# Patient Record
Sex: MALE | Race: WHITE | Employment: OTHER | URBAN - METROPOLITAN AREA
[De-identification: names, ages, dates, MRNs, and addresses within clinical notes are randomized per-mention and may not be internally consistent; named-entity substitution may affect disease eponyms.]

---

## 2017-04-15 ENCOUNTER — HOSPITAL ENCOUNTER (EMERGENCY)
Facility: HOSPITAL | Age: 67
Discharge: HOME | End: 2017-04-15
Attending: EMERGENCY MEDICINE
Payer: MEDICARE

## 2017-04-15 VITALS
SYSTOLIC BLOOD PRESSURE: 141 MMHG | RESPIRATION RATE: 20 BRPM | DIASTOLIC BLOOD PRESSURE: 92 MMHG | WEIGHT: 185 LBS | OXYGEN SATURATION: 97 % | TEMPERATURE: 98 F | HEART RATE: 100 BPM

## 2017-04-15 DIAGNOSIS — T14.8XXA BITE BY ANIMAL: Primary | ICD-10-CM

## 2017-04-15 PROCEDURE — 90472 IMMUNIZATION ADMIN EACH ADD: CPT

## 2017-04-15 PROCEDURE — 90471 IMMUNIZATION ADMIN: CPT

## 2017-04-15 PROCEDURE — 96372 THER/PROPH/DIAG INJ SC/IM: CPT

## 2017-04-15 PROCEDURE — 99284 EMERGENCY DEPT VISIT MOD MDM: CPT

## 2017-04-15 NOTE — ED PROVIDER NOTES
Patient Seen in: [facility] AND [facility] Emergency Department    History   Patient presents with:  Bite (integumentary)    Stated Complaint: racoon bite r leg    HPI    75 yo male with a racoon that is living in his garage.  Today he was preparing to put wood p diagnosis)    Disposition:  There is no disposition on file for this visit.     Follow-up:  [facility] [address]  In 3 day(s)  rabies vaccine series      Medications Prescribed:  T

## 2017-04-17 ENCOUNTER — OFFICE VISIT (OUTPATIENT)
Dept: FAMILY MEDICINE CLINIC | Facility: CLINIC | Age: 67
End: 2017-04-17

## 2017-04-17 VITALS
BODY MASS INDEX: 27.02 KG/M2 | HEART RATE: 106 BPM | SYSTOLIC BLOOD PRESSURE: 157 MMHG | WEIGHT: 193 LBS | TEMPERATURE: 98 F | HEIGHT: 71 IN | RESPIRATION RATE: 20 BRPM | DIASTOLIC BLOOD PRESSURE: 87 MMHG

## 2017-04-17 DIAGNOSIS — L98.9 NON-HEALING SKIN LESION OF NOSE: ICD-10-CM

## 2017-04-17 DIAGNOSIS — R10.32 LEFT GROIN PAIN: Primary | ICD-10-CM
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
## 2017-04-17 DIAGNOSIS — Z23 NEED FOR IMMUNIZATION AGAINST RABIES: ICD-10-CM

## 2017-04-17 PROCEDURE — 99213 OFFICE O/P EST LOW 20 MIN: CPT | Performed by: FAMILY MEDICINE

## 2017-04-17 PROCEDURE — G0463 HOSPITAL OUTPT CLINIC VISIT: HCPCS | Performed by: FAMILY MEDICINE

## 2017-04-17 NOTE — PROGRESS NOTES
HPI:    Patient ID: Kaylan King is a 74year old male. HPI Comments: Pt presents for follow up from the ER for a racoon bite on the back of the right leg. Was given rabies immune globulin and started on rabies vaccine.  Patient had wound cleaned at ER and no hernia noted. - To monitor symptoms and call if worse or not better; Follow up as needed. Consider follow up with surgery if sig symptoms/ pain. No orders of the defined types were placed in this encounter.        Meds This Visit:  No prescripti

## 2017-04-18 ENCOUNTER — HOSPITAL ENCOUNTER (OUTPATIENT)
Age: 67
Discharge: HOME | End: 2017-04-18
Payer: MEDICARE

## 2017-04-18 VITALS
HEART RATE: 93 BPM | SYSTOLIC BLOOD PRESSURE: 151 MMHG | RESPIRATION RATE: 22 BRPM | TEMPERATURE: 98 F | OXYGEN SATURATION: 97 % | DIASTOLIC BLOOD PRESSURE: 88 MMHG

## 2017-04-18 PROCEDURE — 99211 OFF/OP EST MAY X REQ PHY/QHP: CPT

## 2017-04-18 PROCEDURE — 90471 IMMUNIZATION ADMIN: CPT

## 2017-04-18 NOTE — ED INITIAL ASSESSMENT (HPI)
Patient arrived for second scheduled dose of Rabies vaccine. Patient received Imovax 1mL IM injection in left deltoid. Powder Lot #: [id number], Exp: 31 Jul 18. Sterile water Lot #: [id number], Exp: 31 Jul 18.

## 2017-04-18 NOTE — ED NOTES
Patient informed to return on 4/22/17 to [address] [place] [address] for next schedule Rabbies vaccination dose. Patient verbalized and demonstrated understanding. Patient in stable condition at time of discharge.

## 2017-04-22 ENCOUNTER — HOSPITAL ENCOUNTER (OUTPATIENT)
Age: 67
Discharge: HOME | End: 2017-04-22
Payer: MEDICARE

## 2017-04-22 VITALS
DIASTOLIC BLOOD PRESSURE: 79 MMHG | HEIGHT: 71 IN | OXYGEN SATURATION: 97 % | WEIGHT: 192 LBS | BODY MASS INDEX: 26.88 KG/M2 | RESPIRATION RATE: 20 BRPM | HEART RATE: 128 BPM | TEMPERATURE: 98 F | SYSTOLIC BLOOD PRESSURE: 152 MMHG

## 2017-04-22 PROCEDURE — 99211 OFF/OP EST MAY X REQ PHY/QHP: CPT

## 2017-04-22 PROCEDURE — 96372 THER/PROPH/DIAG INJ SC/IM: CPT

## 2017-04-22 NOTE — ED NOTES
Patient arrived for third scheduled dose of Rabies vaccine. Patient receive Imovax 1mL IM injection in the right deltoid. Powder Lot#: [id number], Exp: 31 Jul 18. Sterile water Lot#: [id number], Exp: 31 Jul 18. Patient tolerated well.

## 2017-04-29 ENCOUNTER — HOSPITAL ENCOUNTER (OUTPATIENT)
Age: 67
Discharge: HOME | End: 2017-04-29
Payer: MEDICARE

## 2017-04-29 VITALS
SYSTOLIC BLOOD PRESSURE: 161 MMHG | RESPIRATION RATE: 16 BRPM | HEIGHT: 71 IN | HEART RATE: 88 BPM | BODY MASS INDEX: 26.6 KG/M2 | DIASTOLIC BLOOD PRESSURE: 88 MMHG | OXYGEN SATURATION: 97 % | WEIGHT: 190 LBS | TEMPERATURE: 98 F

## 2017-04-29 PROCEDURE — 99211 OFF/OP EST MAY X REQ PHY/QHP: CPT

## 2017-04-29 PROCEDURE — 90471 IMMUNIZATION ADMIN: CPT

## 2017-04-29 NOTE — ED NOTES
Patient came for his last rabies injection. Patient signed consent. Injection given in his right upper arm. He sat for about 10 minutes after the injection to monitor for side effects. Patient denied any distress. Patient was discharged home.

## 2017-09-01 ENCOUNTER — TELEPHONE (OUTPATIENT)
Dept: FAMILY MEDICINE CLINIC | Facility: CLINIC | Age: 67
End: 2017-09-01

## 2017-09-01 DIAGNOSIS — H53.9 VISUAL DISTURBANCE: Primary | ICD-10-CM

## 2017-09-01 NOTE — TELEPHONE ENCOUNTER
Patient called and stated need a new referral request to see [facility]-     Requesting a call back once in place-

## 2017-09-05 NOTE — TELEPHONE ENCOUNTER
Referral request noted. Referral approved, signed and sent to [facility].

## 2017-09-14 ENCOUNTER — OFFICE VISIT (OUTPATIENT)
Dept: FAMILY MEDICINE CLINIC | Facility: CLINIC | Age: 67
End: 2017-09-14

## 2017-09-14 VITALS
HEART RATE: 76 BPM | SYSTOLIC BLOOD PRESSURE: 139 MMHG | RESPIRATION RATE: 18 BRPM | BODY MASS INDEX: 25.76 KG/M2 | HEIGHT: 71 IN | TEMPERATURE: 98 F | DIASTOLIC BLOOD PRESSURE: 84 MMHG | WEIGHT: 184 LBS

## 2017-09-14 DIAGNOSIS — Z00.00 MEDICARE ANNUAL WELLNESS VISIT, SUBSEQUENT: ICD-10-CM

## 2017-09-14 DIAGNOSIS — Z00.00 ENCOUNTER FOR ANNUAL HEALTH EXAMINATION: ICD-10-CM

## 2017-09-14 DIAGNOSIS — H34.8192 RETINAL VEIN OCCLUSION: Primary | ICD-10-CM

## 2017-09-14 DIAGNOSIS — L98.9 SKIN LESION: ICD-10-CM

## 2017-09-14 PROCEDURE — 96160 PT-FOCUSED HLTH RISK ASSMT: CPT | Performed by: FAMILY MEDICINE

## 2017-09-14 NOTE — PROGRESS NOTES
HPI:   Remington Cunha is a 74year old male who presents for a Medicare Initial Annual Wellness visit (Once after 12 month Medicare anniversary) . Patient is here for routine physical exam and medicare annual check up. Chronic active problems as below.  Amanda exertion  CARDIOVASCULAR: denies chest pain on exertion  GI: denies abdominal pain, denies heartburn  : 0 per night nocturia, no complaint of urinary incontinence  MUSCULOSKELETAL: denies back pain  NEURO: denies headaches  PSYCHE: denies depression or a Visual Acuity  Right Eye Visual Acuity: Uncorrected Right Eye Chart Acuity: 20/40   Left Eye Visual Acuity: Uncorrected Left Eye Chart Acuity: 20/200   Both Eyes Visual Acuity: Uncorrected Both Eyes Chart Acuity: 20/40   Able To Tolerate Visual Acuity: No this visit:    Retinal vein occlusion:   - After discussion, To CPM with Dr Disha Valdez- retina specialist for further treatments; To call if any significant symptoms.      Medicare annual wellness visit, subsequent: has had hx of some elevation of blood pres Bathing or Showering: Able without help    Toileting: Able without help    Dressing: Able without help    Eating: Able without help    Driving: Able without help    Preparing your meals: Able without help    Managing money/bills: Able without help    [facility] External Lab or Procedure   Diabetes Screening      HbgA1C   Annually No results found for: A1C    No flowsheet data found.     Fasting Blood Sugar (FSB)Annually No results found for: GLUCOSE, GLU       Cardiovascular Disease Screening     LDL Annually No r No results found for: LDL, LDLC No flowsheet data found. Dilated Eye exam  Annually No flowsheet data found. No flowsheet data found. COPD      Spirometry Testing Annually Spirometry date:  No flowsheet data found.

## 2017-09-14 NOTE — PATIENT INSTRUCTIONS
Chris Parra's SCREENING SCHEDULE   Tests on this list are recommended by your physician but may not be covered, or covered at this frequency, by your insurer. Please check with your insurance carrier before scheduling to verify coverage.     PREVENTATIVE S for this or any previous visit. No flowsheet data found. Fecal Occult Blood   Covered Annually No results found for: FOB, OCCULTSTOOL No flowsheet data found.      Barium Enema-   uncomfortable but covered  Covered but uncomfortable   Glaucoma Screening benefits     Recommended Websites for Advanced Directives    [employer]ek[employer]no. org/publications/Documents/personal_dec. pdf  An information packet, including necessary form from the [employer] website. http://www. [place]state. [place]/publi

## 2017-09-15 LAB
ABSOLUTE BASOPHILS: 43 CELLS/UL (ref 0–200)
ABSOLUTE EOSINOPHILS: 43 CELLS/UL (ref 15–500)
ABSOLUTE LYMPHOCYTES: 1283 CELLS/UL (ref 850–3900)
ABSOLUTE MONOCYTES: 465 CELLS/UL (ref 200–950)
ABSOLUTE NEUTROPHILS: 4365 CELLS/UL (ref 1500–7800)
ALBUMIN/GLOBULIN RATIO: 1.6 (CALC) (ref 1–2.5)
ALBUMIN: 4.3 G/DL (ref 3.6–5.1)
ALKALINE PHOSPHATASE: 60 U/L (ref 40–115)
ALT: 19 U/L (ref 9–46)
AST: 20 U/L (ref 10–35)
BASOPHILS: 0.7 %
BILIRUBIN, TOTAL: 0.6 MG/DL (ref 0.2–1.2)
BUN: 19 MG/DL (ref 7–25)
CALCIUM: 9.2 MG/DL (ref 8.6–10.3)
CARBON DIOXIDE: 25 MMOL/L (ref 20–31)
CHLORIDE: 104 MMOL/L (ref 98–110)
CHOL/HDLC RATIO: 2.7 (CALC)
CHOLESTEROL, TOTAL: 235 MG/DL
CREATININE: 1 MG/DL (ref 0.7–1.25)
EGFR IF AFRICN AM: 91 ML/MIN/1.73M2
EGFR IF NONAFRICN AM: 79 ML/MIN/1.73M2
EOSINOPHILS: 0.7 %
GLOBULIN: 2.7 G/DL (CALC) (ref 1.9–3.7)
GLUCOSE: 102 MG/DL (ref 65–99)
HDL CHOLESTEROL: 86 MG/DL
HEMATOCRIT: 46.3 % (ref 38.5–50)
HEMOGLOBIN: 16.3 G/DL (ref 13.2–17.1)
LDL-CHOLESTEROL: 122 MG/DL (CALC)
LYMPHOCYTES: 20.7 %
MCH: 31 PG (ref 27–33)
MCHC: 35.2 G/DL (ref 32–36)
MCV: 88.2 FL (ref 80–100)
MONOCYTES: 7.5 %
MPV: 10 FL (ref 7.5–12.5)
NEUTROPHILS: 70.4 %
NON-HDL CHOLESTEROL: 149 MG/DL (CALC)
PLATELET COUNT: 254 THOUSAND/UL (ref 140–400)
POTASSIUM: 4.5 MMOL/L (ref 3.5–5.3)
PROTEIN, TOTAL: 7 G/DL (ref 6.1–8.1)
PSA, TOTAL: 0.9 NG/ML
RDW: 11.9 % (ref 11–15)
RED BLOOD CELL COUNT: 5.25 MILLION/UL (ref 4.2–5.8)
SODIUM: 140 MMOL/L (ref 135–146)
TRIGLYCERIDES: 152 MG/DL
WHITE BLOOD CELL COUNT: 6.2 THOUSAND/UL (ref 3.8–10.8)

## 2017-09-21 ENCOUNTER — OFFICE VISIT (OUTPATIENT)
Dept: DERMATOLOGY CLINIC | Facility: CLINIC | Age: 67
End: 2017-09-21

## 2017-09-21 ENCOUNTER — TELEPHONE (OUTPATIENT)
Dept: FAMILY MEDICINE CLINIC | Facility: CLINIC | Age: 67
End: 2017-09-21

## 2017-09-21 DIAGNOSIS — D23.70 BENIGN NEOPLASM OF SKIN OF LOWER LIMB, INCLUDING HIP, UNSPECIFIED LATERALITY: ICD-10-CM

## 2017-09-21 DIAGNOSIS — D48.5 NEOPLASM OF UNCERTAIN BEHAVIOR OF SKIN: Primary | ICD-10-CM

## 2017-09-21 DIAGNOSIS — D23.5 BENIGN NEOPLASM OF SKIN OF TRUNK, EXCEPT SCROTUM: ICD-10-CM

## 2017-09-21 DIAGNOSIS — D23.30 BENIGN NEOPLASM OF SKIN OF FACE: ICD-10-CM

## 2017-09-21 DIAGNOSIS — C44.310 BCC (BASAL CELL CARCINOMA), FACE: Primary | ICD-10-CM

## 2017-09-21 DIAGNOSIS — D23.4 BENIGN NEOPLASM OF SCALP AND SKIN OF NECK: ICD-10-CM

## 2017-09-21 DIAGNOSIS — C44.212: Primary | ICD-10-CM

## 2017-09-21 DIAGNOSIS — D23.60 BENIGN NEOPLASM OF SKIN OF UPPER LIMB, INCLUDING SHOULDER, UNSPECIFIED LATERALITY: ICD-10-CM

## 2017-09-21 PROCEDURE — 88305 TISSUE EXAM BY PATHOLOGIST: CPT | Performed by: DERMATOLOGY

## 2017-09-21 PROCEDURE — 11100 BIOPSY OF SKIN LESION: CPT | Performed by: DERMATOLOGY

## 2017-09-21 PROCEDURE — 99203 OFFICE O/P NEW LOW 30 MIN: CPT | Performed by: DERMATOLOGY

## 2017-09-21 NOTE — TELEPHONE ENCOUNTER
Referral request noted. Referral approved, generated and sent to [facility].

## 2017-09-23 NOTE — PROGRESS NOTES
Derm Nurses: The pathology report from last visit showed  [facility] from right nasal sidewall. Pt aware of likely diagnosis. Plan is excision with Dr. Aguilar Enhaut. Will need referral from Dr. Tony--pt aware . Please log in test results.   Please call patient and i

## 2017-09-25 NOTE — PROGRESS NOTES
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 Informed that Dr. Flxe Saenz recommends further excision by ears/nose/throat plastic surgeon Dr. Charlie Islas.  Pt informed that he will need a referral fo

## 2017-09-25 NOTE — PROGRESS NOTES
9/21/2017 derm path report result logged in log book and in [address].   LMTCB on voice mail

## 2017-09-26 ENCOUNTER — OFFICE VISIT (OUTPATIENT)
Dept: OTOLARYNGOLOGY | Facility: CLINIC | Age: 67
End: 2017-09-26

## 2017-09-26 VITALS
SYSTOLIC BLOOD PRESSURE: 120 MMHG | DIASTOLIC BLOOD PRESSURE: 70 MMHG | HEIGHT: 71 IN | BODY MASS INDEX: 25.76 KG/M2 | WEIGHT: 184 LBS | TEMPERATURE: 97 F

## 2017-09-26 DIAGNOSIS — C11.2: Primary | ICD-10-CM

## 2017-09-26 PROCEDURE — 99204 OFFICE O/P NEW MOD 45 MIN: CPT | Performed by: OTOLARYNGOLOGY

## 2017-09-26 PROCEDURE — G0463 HOSPITAL OUTPT CLINIC VISIT: HCPCS | Performed by: OTOLARYNGOLOGY

## 2017-09-26 NOTE — PROGRESS NOTES
Vish Santamaria is a 74year old male.   Patient presents with:  Bump: bump/lesions at his face for 6 years      HISTORY OF PRESENT ILLNESS  He presents with a 6 year history of a small lesion on his nasal sidewall on the right which is increased to its current 25.66 kg/m²        Constitutional Normal Overall appearance - Normal.   Psychiatric Normal Orientation - Oriented to time, place, person & situation. Appropriate mood and affect.    Neck Exam Normal Inspection - Normal. Palpation - Normal. Parotid gland - N

## 2017-09-26 NOTE — TELEPHONE ENCOUNTER
Referral request noted. Referral approved, signed and sent to [facility].

## 2017-10-02 NOTE — PROGRESS NOTES
Irineo Coleman is a 74year old male. HPI:     CC:  Patient presents with:  Lesion: New pt presents with lesion on nose x6 years. Has grown, bleeds at times. No pain. Father has hx of unknown skin cancer.          Allergies:  Review of patient's allergies natalie medications, allergies reviewed as noted. Physical Examination:     Well-developed well-nourished patient alert oriented in no acute distress.   Exam total-body performed, including scalp, head, neck, face,nails, hair, external eyes, including conjunc See additional diagnoses. Pros cons of various therapies, risks benefits discussed. Pathophysiology discussed with patient. Therapeutic options reviewed. See  Medications in grid. Instructions reviewed at length.     Benign nevi, seborrheic  keratoses, c

## 2017-10-11 ENCOUNTER — LAB REQUISITION (OUTPATIENT)
Dept: LAB | Facility: HOSPITAL | Age: 67
End: 2017-10-11
Payer: MEDICARE

## 2017-10-11 DIAGNOSIS — Z01.89 ENCOUNTER FOR OTHER SPECIFIED SPECIAL EXAMINATIONS (CODE): ICD-10-CM

## 2017-10-11 PROCEDURE — 88305 TISSUE EXAM BY PATHOLOGIST: CPT | Performed by: OTOLARYNGOLOGY

## 2017-10-11 PROCEDURE — 88332 PATH CONSLTJ SURG EA ADD BLK: CPT | Performed by: OTOLARYNGOLOGY

## 2017-10-11 PROCEDURE — 88331 PATH CONSLTJ SURG 1 BLK 1SPC: CPT | Performed by: OTOLARYNGOLOGY

## 2017-10-12 ENCOUNTER — TELEPHONE (OUTPATIENT)
Dept: OTOLARYNGOLOGY | Facility: CLINIC | Age: 67
End: 2017-10-12

## 2017-10-12 NOTE — TELEPHONE ENCOUNTER
Pt is post op day 1, excision and reconstruction. Per pt he is doing well, hardly any pain, no drainage.   Advised pt to monitor for temp > 102 deg F, drainage, bleeding, redness and streaking from incision, pain not relieved by pain med, and contact our o

## 2017-10-19 ENCOUNTER — OFFICE VISIT (OUTPATIENT)
Dept: OTOLARYNGOLOGY | Facility: CLINIC | Age: 67
End: 2017-10-19

## 2017-10-19 VITALS
TEMPERATURE: 98 F | WEIGHT: 184 LBS | BODY MASS INDEX: 25.76 KG/M2 | SYSTOLIC BLOOD PRESSURE: 142 MMHG | DIASTOLIC BLOOD PRESSURE: 70 MMHG | HEIGHT: 71 IN

## 2017-10-19 DIAGNOSIS — C11.2: Primary | ICD-10-CM

## 2017-10-19 PROCEDURE — 99024 POSTOP FOLLOW-UP VISIT: CPT | Performed by: OTOLARYNGOLOGY

## 2017-10-19 PROCEDURE — G0463 HOSPITAL OUTPT CLINIC VISIT: HCPCS | Performed by: OTOLARYNGOLOGY

## 2017-10-19 RX ORDER — CEPHALEXIN 500 MG/1
CAPSULE ORAL
COMMUNITY
Start: 2017-10-11 | End: 2018-12-13

## 2017-10-19 NOTE — PROGRESS NOTES
Fuad Salgado is a 74year old male.   Patient presents with:  Procedure Follow Up: patient presents for f/u on excision of nose lesion on 10/11      HISTORY OF PRESENT ILLNESS  He presents with a 6 year history of a small lesion on his nasal sidewall on the r Neuro Negative Tremors. Psych Negative Anxiety and depression. Integumentary Negative Frequent skin infections, pigment change and rash. Hema/Lymph Negative Easy bleeding and easy bruising.            PHYSICAL EXAM    /70 (BP Location: Left ar nasal sidewall. Routine wound care was discussed and understood. Return to see me in 4 months for routine oncologic follow-up. Yousuf Saenz.  Roman Pimentel MD    10/19/2017    1:22 PM

## 2017-11-27 ENCOUNTER — TELEPHONE (OUTPATIENT)
Dept: FAMILY MEDICINE CLINIC | Facility: CLINIC | Age: 67
End: 2017-11-27

## 2017-11-27 DIAGNOSIS — H34.8120 HEMISPHERIC RETINAL VEIN OCCLUSION WITH MACULAR EDEMA OF LEFT EYE: Primary | ICD-10-CM

## 2017-11-27 NOTE — TELEPHONE ENCOUNTER
Referral request noted. Referral approved, signed and sent to [facility].

## 2017-11-27 NOTE — TELEPHONE ENCOUNTER
Patient called requesting a referral to follow up with Dr Mariposa Bangura.      Please sign off on referral request.     Thank you, Musa

## 2018-02-13 ENCOUNTER — TELEPHONE (OUTPATIENT)
Dept: INTERNAL MEDICINE CLINIC | Facility: CLINIC | Age: 68
End: 2018-02-13

## 2018-02-15 ENCOUNTER — OFFICE VISIT (OUTPATIENT)
Dept: OTOLARYNGOLOGY | Facility: CLINIC | Age: 68
End: 2018-02-15

## 2018-02-15 VITALS
TEMPERATURE: 98 F | DIASTOLIC BLOOD PRESSURE: 88 MMHG | BODY MASS INDEX: 25.76 KG/M2 | HEIGHT: 71 IN | SYSTOLIC BLOOD PRESSURE: 147 MMHG | WEIGHT: 184 LBS

## 2018-02-15 DIAGNOSIS — C11.2: Primary | ICD-10-CM

## 2018-02-15 PROCEDURE — G0463 HOSPITAL OUTPT CLINIC VISIT: HCPCS | Performed by: OTOLARYNGOLOGY

## 2018-02-15 PROCEDURE — 99213 OFFICE O/P EST LOW 20 MIN: CPT | Performed by: OTOLARYNGOLOGY

## 2018-02-15 NOTE — PROGRESS NOTES
Tricia Guerrero is a 79year old male. Patient presents with:   Follow - Up: 4 month follow up- cancer of lateral nasal wall-c/o itchy nose      HISTORY OF PRESENT ILLNESS  He presents with a 6 year history of a small lesion on his nasal sidewall on the right w Respiratory Negative Dyspnea and wheezing. Cardio Negative Chest pain, irregular heartbeat/palpitations and syncope. GI Negative Abdominal pain and diarrhea. Endocrine Negative Cold intolerance and heat intolerance. Neuro Negative Tremors.    Psyc nasal wall ([address]  Doing very well. Some itchiness at the site with some scarring noted. I have asked him to start the use of silicone bandages to this area for the next several months and to return to see me on a as needed basis.   I also asked him to fol

## 2018-02-16 ENCOUNTER — TELEPHONE (OUTPATIENT)
Dept: FAMILY MEDICINE CLINIC | Facility: CLINIC | Age: 68
End: 2018-02-16

## 2018-02-16 NOTE — TELEPHONE ENCOUNTER
Per patient had labs done at [facility] on 9/14/2017 patient has Open[employer] and they didn't cover any chargers as they are advising diagnosis code used is incorrect and needs to be change to reflect the wellness visit per [facility] patient physician needs to c

## 2018-02-19 NOTE — TELEPHONE ENCOUNTER
Tamala Kanner,     In order to correct a dx on lab orders, the provider needs to send corrected orders to [address] [place] [address].     Thank you   Lizeth Pedersen

## 2018-02-19 NOTE — TELEPHONE ENCOUNTER
Dr. Natalia Medeiros please review message below. Contacted [employer] and was told DX used on 9/14/2017 was Z00.00 routine px. Per Sal from [employer] dx only covered 1/4 labs ordered. Covered the CMP, and denied the PSA, Lipid Panel, and CBC.  Per Sal there is no info

## 2018-02-19 NOTE — TELEPHONE ENCOUNTER
Dr. Miguel Bang I called patient's insurance to inquire information regarding denial for labs PSA, Lipid Panel, and CBC. Per Insurance those 3 labs failed the medical  Necessity.  Was told we can send an appeal via mail to;  [address] [facility], [address] [place] [address]

## 2018-02-20 NOTE — TELEPHONE ENCOUNTER
Letter mailed to appeal [address],   [address] [facility]  [place], [address] [facility]

## 2018-02-20 NOTE — TELEPHONE ENCOUNTER
Was instructed provider needs to write medical necessity letter to appeal for the labs that were drawn and not covered. And letter will be mailed to address provided by [address] Dr. Mauro Holter please advise.

## 2018-03-06 ENCOUNTER — MED REC SCAN ONLY (OUTPATIENT)
Dept: FAMILY MEDICINE CLINIC | Facility: CLINIC | Age: 68
End: 2018-03-06

## 2018-04-03 ENCOUNTER — TELEPHONE (OUTPATIENT)
Dept: INTERNAL MEDICINE CLINIC | Facility: CLINIC | Age: 68
End: 2018-04-03

## 2018-04-26 ENCOUNTER — TELEPHONE (OUTPATIENT)
Dept: FAMILY MEDICINE CLINIC | Facility: CLINIC | Age: 68
End: 2018-04-26

## 2018-04-26 DIAGNOSIS — H35.9 RETINAL DISEASE: Primary | ICD-10-CM

## 2018-04-26 NOTE — TELEPHONE ENCOUNTER
Referral request noted. Referral approved, signed and sent to [facility].

## 2018-04-26 NOTE — TELEPHONE ENCOUNTER
Patient called requesting a referral to follow-up with Dr Luciana Evangelista.      Please sign off on referral request.     Thank you, Musa

## 2018-05-25 ENCOUNTER — TELEPHONE (OUTPATIENT)
Dept: FAMILY MEDICINE CLINIC | Facility: CLINIC | Age: 68
End: 2018-05-25

## 2018-05-25 DIAGNOSIS — Z12.11 ENCOUNTER FOR SCREENING COLONOSCOPY: Primary | ICD-10-CM

## 2018-05-25 NOTE — TELEPHONE ENCOUNTER
Patient is requesting a referral for a follow up visit with Dr Manoj Lancaster .  It is time for his 5 year recall for his colonscopy

## 2018-05-26 NOTE — TELEPHONE ENCOUNTER
Referral request noted. Referral approved, signed and sent to [facility].

## 2018-06-11 NOTE — H&P
[facility]    PATIENT'S NAME: J CARLOS SANCHEZ   ATTENDING PHYSICIAN: Gwendolyn Hooks MD   PATIENT ACCOUNT#:   [id number]    LOCATION:    MEDICAL RECORD #:   [id number]       YOB: 1950  ADMISSION DATE:       06/13/2018    HISTORY AND P

## 2018-06-13 ENCOUNTER — HOSPITAL ENCOUNTER (OUTPATIENT)
Facility: HOSPITAL | Age: 68
Setting detail: HOSPITAL OUTPATIENT SURGERY
Discharge: HOME | End: 2018-06-13
Attending: SPECIALIST | Admitting: SPECIALIST
Payer: MEDICARE

## 2018-06-13 ENCOUNTER — TELEPHONE (OUTPATIENT)
Dept: FAMILY MEDICINE CLINIC | Facility: CLINIC | Age: 68
End: 2018-06-13

## 2018-06-13 ENCOUNTER — SURGERY (OUTPATIENT)
Age: 68
End: 2018-06-13

## 2018-06-13 VITALS
SYSTOLIC BLOOD PRESSURE: 147 MMHG | OXYGEN SATURATION: 95 % | BODY MASS INDEX: 24.38 KG/M2 | DIASTOLIC BLOOD PRESSURE: 98 MMHG | HEIGHT: 72 IN | WEIGHT: 180 LBS | RESPIRATION RATE: 18 BRPM | HEART RATE: 85 BPM

## 2018-06-13 DIAGNOSIS — Z12.11 COLON CANCER SCREENING: ICD-10-CM

## 2018-06-13 PROCEDURE — 0DBL8ZX EXCISION OF TRANSVERSE COLON, VIA NATURAL OR ARTIFICIAL OPENING ENDOSCOPIC, DIAGNOSTIC: ICD-10-PCS | Performed by: SPECIALIST

## 2018-06-13 PROCEDURE — 88305 TISSUE EXAM BY PATHOLOGIST: CPT | Performed by: SPECIALIST

## 2018-06-13 PROCEDURE — 99152 MOD SED SAME PHYS/QHP 5/>YRS: CPT | Performed by: SPECIALIST

## 2018-06-13 PROCEDURE — 99153 MOD SED SAME PHYS/QHP EA: CPT | Performed by: SPECIALIST

## 2018-06-13 RX ORDER — SODIUM CHLORIDE 0.9 % (FLUSH) 0.9 %
10 SYRINGE (ML) INJECTION AS NEEDED
Status: DISCONTINUED | OUTPATIENT
Start: 2018-06-13 | End: 2018-06-13

## 2018-06-13 RX ORDER — MIDAZOLAM HYDROCHLORIDE 1 MG/ML
INJECTION INTRAMUSCULAR; INTRAVENOUS
Status: DISCONTINUED | OUTPATIENT
Start: 2018-06-13 | End: 2018-06-13

## 2018-06-13 RX ORDER — SODIUM CHLORIDE, SODIUM LACTATE, POTASSIUM CHLORIDE, CALCIUM CHLORIDE 600; 310; 30; 20 MG/100ML; MG/100ML; MG/100ML; MG/100ML
INJECTION, SOLUTION INTRAVENOUS CONTINUOUS
Status: DISCONTINUED | OUTPATIENT
Start: 2018-06-13 | End: 2018-06-13

## 2018-06-13 RX ORDER — MIDAZOLAM HYDROCHLORIDE 1 MG/ML
1 INJECTION INTRAMUSCULAR; INTRAVENOUS EVERY 5 MIN PRN
Status: DISCONTINUED | OUTPATIENT
Start: 2018-06-13 | End: 2018-06-13

## 2018-06-13 NOTE — OPERATIVE REPORT
[facility]    PATIENT'S NAME: J CARLOS SANCHEZ   ATTENDING PHYSICIAN: Gwendolyn Biggs MD   OPERATING PHYSICIAN: Shantell Bauer.  Maksim Biggs MD   PATIENT ACCOUNT#:   [id number]    LOCATION:  [address] ROOM 5 [facility]  MEDICAL RECORD #:   [id number]

## 2018-06-13 NOTE — TELEPHONE ENCOUNTER
Spoke with Dr Maksim Biggs. Pt appears to have inguinal hernia and was advised to see surgery but pt declined.

## 2018-06-27 ENCOUNTER — MED REC SCAN ONLY (OUTPATIENT)
Dept: FAMILY MEDICINE CLINIC | Facility: CLINIC | Age: 68
End: 2018-06-27

## 2018-10-16 ENCOUNTER — PATIENT OUTREACH (OUTPATIENT)
Dept: CASE MANAGEMENT | Age: 68
End: 2018-10-16

## 2018-10-16 NOTE — PROGRESS NOTES
Pt is eligible for 2018 Medicare Annual Health Assessment appointment. Left message to call back [phone].

## 2018-12-07 NOTE — PROGRESS NOTES
Pt returned call and scheduled his Medicare Annual Exam [facility]) for 12/13/2018 with his PCP. Thank you.

## 2018-12-13 ENCOUNTER — OFFICE VISIT (OUTPATIENT)
Dept: FAMILY MEDICINE CLINIC | Facility: CLINIC | Age: 68
End: 2018-12-13

## 2018-12-13 VITALS
HEART RATE: 81 BPM | SYSTOLIC BLOOD PRESSURE: 137 MMHG | TEMPERATURE: 98 F | RESPIRATION RATE: 18 BRPM | HEIGHT: 71 IN | WEIGHT: 192 LBS | DIASTOLIC BLOOD PRESSURE: 83 MMHG | BODY MASS INDEX: 26.88 KG/M2

## 2018-12-13 DIAGNOSIS — K40.90 LEFT INGUINAL HERNIA: ICD-10-CM

## 2018-12-13 DIAGNOSIS — Z00.00 MEDICARE ANNUAL WELLNESS VISIT, SUBSEQUENT: ICD-10-CM

## 2018-12-13 DIAGNOSIS — Z00.00 ENCOUNTER FOR ANNUAL HEALTH EXAMINATION: Primary | ICD-10-CM

## 2018-12-13 PROCEDURE — G0438 PPPS, INITIAL VISIT: HCPCS | Performed by: FAMILY MEDICINE

## 2018-12-13 PROCEDURE — 96160 PT-FOCUSED HLTH RISK ASSMT: CPT | Performed by: FAMILY MEDICINE

## 2018-12-13 NOTE — PROGRESS NOTES
HPI:   Ronda Watters is a 79year old male who presents for a Medicare Subsequent Annual Wellness visit (Pt already had Initial Annual Wellness). Patient is here for routine physical exam and medicare annual check up. No acute issues.   Patient has been d kg)  02/15/18 : 184 lb (83.5 kg)     Last Cholesterol Labs:   Lab Results   Component Value Date    CHOLEST 235 (H) 09/14/2017    HDL 86 09/14/2017     (H) 09/14/2017    TRIG 152 (H) 09/14/2017          Last Chemistry Labs:   Lab Results   Component Weight as of this encounter: 192 lb (87.1 kg).     Medicare Hearing Assessment  (Required for AWV/SWV)    Hearing Screening    Screening Method:  Questionnaire  I have a problem hearing over the telephone:  No I have trouble following the conversations when present. Cardiovascular: Normal rate, regular rhythm and normal heart sounds. Pulmonary/Chest: Effort normal and breath sounds normal.   Abdominal: Soft. He exhibits no distension and no mass. There is no hepatosplenomegaly. There is no tenderness.  A well-being?: Vis[facility] Friends    This section provided for quick review of chart, separate sheet to patient  [address] [place] [address],[address] Internal Lab or Procedure External Lab or Procedure   Diabetes Screening      HbgA1C   Annually No covered with a cut with metal- TD and TDaP Not covered by Medicare Part B) No vaccine history found This may be covered with your prescription benefits, but Medicare does not cover unless Medically needed    Zoster   Not covered by Medicare Part B No vacci

## 2018-12-13 NOTE — PATIENT INSTRUCTIONS
Wash Dallin Parra's SCREENING SCHEDULE   Tests on this list are recommended by your physician but may not be covered, or covered at this frequency, by your insurer. Please check with your insurance carrier before scheduling to verify coverage.     PREVENTATIVE often if abnormal Colonoscopy due on 06/13/2028 Update [facility] if applicable    Flex Sigmoidoscopy Screen  Covered every 5 years No results found for this or any previous visit. No flowsheet data found.      Fecal Occult Blood   Covered Annually needed    Zoster (Not covered by Medicare Part B) No orders found for this or any previous visit. This may be covered with your pharmacy  prescription benefits     Recommended Websites for Advanced Directives    [email or web address].no. org/publications/Documents/p

## 2018-12-18 ENCOUNTER — MA CHART PREP (OUTPATIENT)
Dept: FAMILY MEDICINE CLINIC | Facility: CLINIC | Age: 68
End: 2018-12-18

## 2018-12-18 PROBLEM — Z85.828 HISTORY OF SKIN CANCER: Status: ACTIVE | Noted: 2018-12-18

## 2019-03-18 ENCOUNTER — PATIENT OUTREACH (OUTPATIENT)
Dept: CASE MANAGEMENT | Age: 69
End: 2019-03-18

## 2019-03-18 NOTE — PROGRESS NOTES
Pt is eligible for 2019 Medicare Annual Health Assessment visit. Left message to call back [phone].

## 2019-05-02 ENCOUNTER — PATIENT OUTREACH (OUTPATIENT)
Dept: CASE MANAGEMENT | Age: 69
End: 2019-05-02

## 2019-05-02 NOTE — PROGRESS NOTES
Pt is eligible for 2019 Medicare Annual Health Assessment visit. Left message to call back [phone].

## 2019-05-06 ENCOUNTER — PATIENT OUTREACH (OUTPATIENT)
Dept: CASE MANAGEMENT | Age: 69
End: 2019-05-06

## 2019-05-06 NOTE — PROGRESS NOTES
Pt is eligible for 2019 Medicare Annual Health Assessment. After multiple attempts to reach patient by phone a letter was sent to patient requesting a call back to discuss.

## 2019-08-22 ENCOUNTER — TELEPHONE (OUTPATIENT)
Dept: CASE MANAGEMENT | Age: 69
End: 2019-08-22

## 2019-08-22 NOTE — TELEPHONE ENCOUNTER
Patient is eligible for a 2019 Annual Medicare Health Assessment. Left message to call back [phone]. Letter sent 5/2019.

## 2019-09-01 ENCOUNTER — MA CHART PREP (OUTPATIENT)
Dept: FAMILY MEDICINE CLINIC | Facility: CLINIC | Age: 69
End: 2019-09-01

## 2019-11-18 ENCOUNTER — OFFICE VISIT (OUTPATIENT)
Dept: FAMILY MEDICINE CLINIC | Facility: CLINIC | Age: 69
End: 2019-11-18
Payer: MEDICARE

## 2019-11-18 VITALS
DIASTOLIC BLOOD PRESSURE: 81 MMHG | RESPIRATION RATE: 18 BRPM | HEART RATE: 84 BPM | HEIGHT: 71.5 IN | BODY MASS INDEX: 25.61 KG/M2 | WEIGHT: 187 LBS | TEMPERATURE: 98 F | SYSTOLIC BLOOD PRESSURE: 126 MMHG

## 2019-11-18 DIAGNOSIS — E78.00 ELEVATED CHOLESTEROL: ICD-10-CM

## 2019-11-18 DIAGNOSIS — Z12.5 PROSTATE CANCER SCREENING: ICD-10-CM

## 2019-11-18 DIAGNOSIS — Z00.00 MEDICARE ANNUAL WELLNESS VISIT, SUBSEQUENT: ICD-10-CM

## 2019-11-18 DIAGNOSIS — K40.90 LEFT INGUINAL HERNIA: Primary | ICD-10-CM

## 2019-11-18 DIAGNOSIS — Z00.00 ENCOUNTER FOR ANNUAL HEALTH EXAMINATION: ICD-10-CM

## 2019-11-18 PROCEDURE — 96160 PT-FOCUSED HLTH RISK ASSMT: CPT | Performed by: FAMILY MEDICINE

## 2019-11-18 PROCEDURE — 99397 PER PM REEVAL EST PAT 65+ YR: CPT | Performed by: FAMILY MEDICINE

## 2019-11-18 PROCEDURE — G0439 PPPS, SUBSEQ VISIT: HCPCS | Performed by: FAMILY MEDICINE

## 2019-11-18 NOTE — PATIENT INSTRUCTIONS
Tyler Parra's SCREENING SCHEDULE   Tests on this list are recommended by your physician but may not be covered, or covered at this frequency, by your insurer. Please check with your insurance carrier before scheduling to verify coverage.     PREVENTATIVE often if abnormal Colonoscopy due on 06/13/2028 Update [facility] if applicable    Flex Sigmoidoscopy Screen  Covered every 5 years No results found for this or any previous visit. No flowsheet data found.      Fecal Occult Blood   Covered Annually needed    Zoster (Not covered by Medicare Part B) No orders found for this or any previous visit. This may be covered with your pharmacy  prescription benefits     Recommended Websites for Advanced Directives    [email or web address]no. org/publications/Documents/p

## 2019-11-18 NOTE — PROGRESS NOTES
HPI:   Jill Tadeo is a 81year old male who presents for a Medicare Subsequent Annual Wellness visit (Pt already had Initial Annual Wellness). Patient is here for routine physical exam and medicare annual check up.  Patient is requesting annual testing 180 lb (81.6 kg)     Last Cholesterol Labs:   Lab Results   Component Value Date    CHOLEST 235 (H) 09/14/2017    HDL 86 09/14/2017     (H) 09/14/2017    TRIG 152 (H) 09/14/2017          Last Chemistry Labs:   Lab Results   Component Value Date    A encounter: 5' 11.5\" (1.816 m). Weight as of this encounter: 187 lb (84.8 kg).     Medicare Hearing Assessment  (Required for AWV/SWV)    Hearing Screening    Screening Method:  Questionnaire  I have a problem hearing over the telephone:  No I have troub thyromegaly present. Cardiovascular: Normal rate, regular rhythm, normal heart sounds and intact distal pulses. No murmur heard. Pulmonary/Chest: Effort normal and breath sounds normal. No respiratory distress. He has no wheezes. He has no rales.    A further management after testing. Routine follow up. Diet assessment: good     PLAN:  The patient indicates understanding of these issues and agrees to the plan. Reinforced healthy diet, lifestyle, and exercise. No follow-ups on file. (Prevnar)  Covered Once after 65 No vaccine history found Please get once after your 65th birthday    Pneumococcal 23 (Pneumovax)  Covered Once after 65 No vaccine history found Please get once after your 65th birthday    Hepatitis B for Moderate/High Risk

## 2019-11-20 ENCOUNTER — APPOINTMENT (OUTPATIENT)
Dept: LAB | Age: 69
End: 2019-11-20
Attending: FAMILY MEDICINE
Payer: MEDICARE

## 2019-11-20 DIAGNOSIS — E78.00 ELEVATED CHOLESTEROL: ICD-10-CM

## 2019-11-20 DIAGNOSIS — Z12.5 PROSTATE CANCER SCREENING: ICD-10-CM

## 2019-11-20 PROCEDURE — 80053 COMPREHEN METABOLIC PANEL: CPT

## 2019-11-20 PROCEDURE — 80061 LIPID PANEL: CPT

## 2019-11-20 PROCEDURE — 85027 COMPLETE CBC AUTOMATED: CPT

## 2019-11-20 PROCEDURE — 36415 COLL VENOUS BLD VENIPUNCTURE: CPT

## 2020-07-27 ENCOUNTER — TELEPHONE (OUTPATIENT)
Dept: CASE MANAGEMENT | Age: 70
End: 2020-07-27

## 2020-08-17 ENCOUNTER — TELEPHONE (OUTPATIENT)
Dept: CASE MANAGEMENT | Age: 70
End: 2020-08-17

## 2020-08-17 NOTE — TELEPHONE ENCOUNTER
Patient is eligible for a 2020 Medicare Advantage Supervisit. Left message to call back [phone].

## 2020-09-03 ENCOUNTER — TELEPHONE (OUTPATIENT)
Dept: CASE MANAGEMENT | Age: 70
End: 2020-09-03

## 2020-09-03 NOTE — TELEPHONE ENCOUNTER
Patient is eligible for a 2020 Medicare Advantage Supervisit. Discussed in detail w/patient. Patient declined to schedule at this time.

## 2020-12-03 ENCOUNTER — TELEPHONE (OUTPATIENT)
Dept: FAMILY MEDICINE CLINIC | Facility: CLINIC | Age: 70
End: 2020-12-03

## 2020-12-22 ENCOUNTER — TELEPHONE (OUTPATIENT)
Dept: FAMILY MEDICINE CLINIC | Facility: CLINIC | Age: 70
End: 2020-12-22

## 2021-01-18 ENCOUNTER — MED REC SCAN ONLY (OUTPATIENT)
Dept: FAMILY MEDICINE CLINIC | Facility: CLINIC | Age: 71
End: 2021-01-18

## 2021-01-19 ENCOUNTER — TELEPHONE (OUTPATIENT)
Dept: CASE MANAGEMENT | Age: 71
End: 2021-01-19

## 2021-01-19 DIAGNOSIS — H26.9 CATARACT OF LEFT EYE, UNSPECIFIED CATARACT TYPE: Primary | ICD-10-CM

## 2021-01-19 NOTE — TELEPHONE ENCOUNTER
Pt calling in regards to a referral to see Dr. Aleah Narvaez. Pt advised by his retinal specialist to get this taken care of. Please sign off on referral if you agree with plan of care.

## 2021-01-19 NOTE — TELEPHONE ENCOUNTER
Referral request noted. Referral approved, signed and sent to [facility].   Pt notified through [facility]

## 2021-02-15 ENCOUNTER — TELEPHONE (OUTPATIENT)
Dept: FAMILY MEDICINE CLINIC | Facility: CLINIC | Age: 71
End: 2021-02-15

## 2021-02-15 NOTE — TELEPHONE ENCOUNTER
Patient all message left on pt  Voicemail to call us o schedule pre op visit before his Cataract surgery on 3/9/21. Call Center or Psr please reach out to pt again to schedule pre op appointment.   thanks

## 2021-02-16 ENCOUNTER — OFFICE VISIT (OUTPATIENT)
Dept: FAMILY MEDICINE CLINIC | Facility: CLINIC | Age: 71
End: 2021-02-16
Payer: MEDICARE

## 2021-02-16 VITALS
WEIGHT: 193 LBS | SYSTOLIC BLOOD PRESSURE: 118 MMHG | RESPIRATION RATE: 20 BRPM | HEART RATE: 85 BPM | HEIGHT: 72 IN | BODY MASS INDEX: 26.14 KG/M2 | DIASTOLIC BLOOD PRESSURE: 76 MMHG | TEMPERATURE: 98 F

## 2021-02-16 DIAGNOSIS — Z01.818 PREOPERATIVE GENERAL PHYSICAL EXAMINATION: ICD-10-CM

## 2021-02-16 DIAGNOSIS — H26.9 CATARACT OF LEFT EYE, UNSPECIFIED CATARACT TYPE: ICD-10-CM

## 2021-02-16 PROCEDURE — 99214 OFFICE O/P EST MOD 30 MIN: CPT | Performed by: FAMILY MEDICINE

## 2021-02-16 PROCEDURE — 3008F BODY MASS INDEX DOCD: CPT | Performed by: FAMILY MEDICINE

## 2021-02-16 PROCEDURE — 3074F SYST BP LT 130 MM HG: CPT | Performed by: FAMILY MEDICINE

## 2021-02-16 PROCEDURE — 3078F DIAST BP <80 MM HG: CPT | Performed by: FAMILY MEDICINE

## 2021-02-16 NOTE — PROGRESS NOTES
HPI:    Patient ID: Westley Frank is a 73year old male. Patient is here for for preoperative history and physical. The patient will be having cataract surgery of the left eye with Dr. Kellie Montero on 3/9/2021 at [facility]. No acute issues or problems.  No sig  Psychiatric: He has a normal mood and affect. His behavior is normal.              ASSESSMENT/PLAN:   Preoperative general physical examination/ Cataract of left eye, unspecified cataract type:  - Exam unremarkable:   Will clear patient for surgery with D

## 2021-02-17 ENCOUNTER — TELEPHONE (OUTPATIENT)
Dept: FAMILY MEDICINE CLINIC | Facility: CLINIC | Age: 71
End: 2021-02-17

## 2021-02-17 NOTE — TELEPHONE ENCOUNTER
Patient will call us when he wants to schedule his next medicare wellness exam/Dr. Luis Fernando Chambers

## 2021-03-06 ENCOUNTER — TELEPHONE (OUTPATIENT)
Dept: FAMILY MEDICINE CLINIC | Facility: CLINIC | Age: 71
End: 2021-03-06

## 2021-03-06 ENCOUNTER — LAB REQUISITION (OUTPATIENT)
Dept: LAB | Facility: HOSPITAL | Age: 71
End: 2021-03-06
Payer: MEDICARE

## 2021-03-06 DIAGNOSIS — Z01.818 ENCOUNTER FOR OTHER PREPROCEDURAL EXAMINATION: ICD-10-CM

## 2021-03-06 LAB — SARS-COV-2 RNA RESP QL NAA+PROBE: NOT DETECTED

## 2021-04-19 ENCOUNTER — TELEPHONE (OUTPATIENT)
Dept: CASE MANAGEMENT | Age: 71
End: 2021-04-19

## 2021-04-27 ENCOUNTER — TELEPHONE (OUTPATIENT)
Dept: FAMILY MEDICINE CLINIC | Facility: CLINIC | Age: 71
End: 2021-04-27

## 2021-05-04 ENCOUNTER — MED REC SCAN ONLY (OUTPATIENT)
Dept: FAMILY MEDICINE CLINIC | Facility: CLINIC | Age: 71
End: 2021-05-04

## 2021-05-26 ENCOUNTER — TELEPHONE (OUTPATIENT)
Dept: FAMILY MEDICINE CLINIC | Facility: CLINIC | Age: 71
End: 2021-05-26

## 2021-06-04 ENCOUNTER — TELEPHONE (OUTPATIENT)
Dept: FAMILY MEDICINE CLINIC | Facility: CLINIC | Age: 71
End: 2021-06-04

## 2021-06-04 NOTE — TELEPHONE ENCOUNTER
Anya returned call and the patient was sheduled for pre op visit with Dr. Carlos Jaffe for 6/7 @ 10:20AM.

## 2021-06-04 NOTE — TELEPHONE ENCOUNTER
Per Nanette/arden burris to book the patient on Monday at 10:20 for pre op. Phone Anya at Dr. Wilton Mejia office and was placed on hold. Call was disconnected. When, [facility] phones back please scheduled the appointment.

## 2021-06-04 NOTE — TELEPHONE ENCOUNTER
Jaye Schneider, states that the patient is scheduled for surgery on 6-10-21 for detached retina of the left eye. Jaye Schneider, would like to know if the doctor can see the patient on Monday for pre op clearance. There are no available appointments.

## 2021-06-07 ENCOUNTER — MED REC SCAN ONLY (OUTPATIENT)
Dept: FAMILY MEDICINE CLINIC | Facility: CLINIC | Age: 71
End: 2021-06-07

## 2021-06-07 ENCOUNTER — OFFICE VISIT (OUTPATIENT)
Dept: FAMILY MEDICINE CLINIC | Facility: CLINIC | Age: 71
End: 2021-06-07
Payer: MEDICARE

## 2021-06-07 ENCOUNTER — LAB REQUISITION (OUTPATIENT)
Dept: LAB | Facility: HOSPITAL | Age: 71
End: 2021-06-07
Payer: MEDICARE

## 2021-06-07 ENCOUNTER — LAB ENCOUNTER (OUTPATIENT)
Dept: LAB | Age: 71
End: 2021-06-07
Attending: FAMILY MEDICINE
Payer: MEDICARE

## 2021-06-07 VITALS
HEIGHT: 72 IN | SYSTOLIC BLOOD PRESSURE: 134 MMHG | BODY MASS INDEX: 24.89 KG/M2 | HEART RATE: 93 BPM | WEIGHT: 183.81 LBS | DIASTOLIC BLOOD PRESSURE: 83 MMHG

## 2021-06-07 DIAGNOSIS — H35.9 RETINAL AND VITREOUS DISORDER: ICD-10-CM

## 2021-06-07 DIAGNOSIS — Z01.818 ENCOUNTER FOR OTHER PREPROCEDURAL EXAMINATION: ICD-10-CM

## 2021-06-07 DIAGNOSIS — H43.9 RETINAL AND VITREOUS DISORDER: ICD-10-CM

## 2021-06-07 DIAGNOSIS — Z01.818 PREOPERATIVE GENERAL PHYSICAL EXAMINATION: Primary | ICD-10-CM

## 2021-06-07 DIAGNOSIS — Z01.818 PREOPERATIVE GENERAL PHYSICAL EXAMINATION: ICD-10-CM

## 2021-06-07 PROCEDURE — 3008F BODY MASS INDEX DOCD: CPT | Performed by: FAMILY MEDICINE

## 2021-06-07 PROCEDURE — 99214 OFFICE O/P EST MOD 30 MIN: CPT | Performed by: FAMILY MEDICINE

## 2021-06-07 PROCEDURE — 93010 ELECTROCARDIOGRAM REPORT: CPT | Performed by: FAMILY MEDICINE

## 2021-06-07 PROCEDURE — 85027 COMPLETE CBC AUTOMATED: CPT

## 2021-06-07 PROCEDURE — 36415 COLL VENOUS BLD VENIPUNCTURE: CPT

## 2021-06-07 PROCEDURE — 93005 ELECTROCARDIOGRAM TRACING: CPT

## 2021-06-07 PROCEDURE — 3075F SYST BP GE 130 - 139MM HG: CPT | Performed by: FAMILY MEDICINE

## 2021-06-07 PROCEDURE — 3079F DIAST BP 80-89 MM HG: CPT | Performed by: FAMILY MEDICINE

## 2021-06-07 NOTE — PROGRESS NOTES
HPI/Subjective:   Patient ID: Rishi Sun is a 73year old male. Patient is here for for preoperative history and physical. The patient will be having eye surgery with Dr. Ramírez Pham on 6/10 /21 for his left eye.  No acute issues or problems or any sig ch Palpations: Abdomen is soft. Genitourinary:     Penis: Normal.       Prostate: Normal.      Rectum: Normal.   Musculoskeletal:         General: Normal range of motion. Cervical back: Normal range of motion and neck supple.    Skin:     General: Skin

## 2021-06-08 ENCOUNTER — TELEPHONE (OUTPATIENT)
Dept: FAMILY MEDICINE CLINIC | Facility: CLINIC | Age: 71
End: 2021-06-08

## 2021-06-08 NOTE — TELEPHONE ENCOUNTER
Patient Pre op information fax over to [facility] Attn: Dr. Mimi Silver @ [phone] confirmation recd @ 13:28pm .   Pre op information will be in my yellow if needed until patient surgery date 6/10/21

## 2021-07-21 ENCOUNTER — TELEPHONE (OUTPATIENT)
Dept: FAMILY MEDICINE CLINIC | Facility: CLINIC | Age: 71
End: 2021-07-21

## 2021-07-21 NOTE — TELEPHONE ENCOUNTER
Letter with addendum generated and no need to repeat preop visit and testing and just done last month.  New letter given to staff to fax

## 2021-07-21 NOTE — TELEPHONE ENCOUNTER
Dr. Deon Moeller Please see message below for emergency surgery.   Please addendum pre op from  [facility]

## 2021-07-21 NOTE — TELEPHONE ENCOUNTER
Per April, patient had a pre-op last 06/07/2021 and would like to know if Dr Michael Driver and issue his pre-op so that they can schedule him for his scleral buckle with vitrectomy for tomorrow. Please advise. Please fax the medical clearance @ [phone].

## 2021-07-21 NOTE — TELEPHONE ENCOUNTER
See message below for detail. Addendum letter and pre op information fax Attn Anya @ [facility] . [phone]

## 2021-07-21 NOTE — TELEPHONE ENCOUNTER
Anya/[facility] [phone] states that the patient is having emergency tomorrow. Lindsey Weiss, would like to know if the last pre op clearance is ok for the patient and can it be amended. If not the patient needs to be seen asap.  If going be amended please f

## 2021-07-22 ENCOUNTER — ANESTHESIA EVENT (OUTPATIENT)
Dept: SURGERY | Facility: HOSPITAL | Age: 71
End: 2021-07-22
Payer: MEDICARE

## 2021-07-22 ENCOUNTER — HOSPITAL ENCOUNTER (OUTPATIENT)
Facility: HOSPITAL | Age: 71
Setting detail: HOSPITAL OUTPATIENT SURGERY
Discharge: HOME | End: 2021-07-22
Attending: OPHTHALMOLOGY | Admitting: OPHTHALMOLOGY
Payer: MEDICARE

## 2021-07-22 ENCOUNTER — ANESTHESIA (OUTPATIENT)
Dept: SURGERY | Facility: HOSPITAL | Age: 71
End: 2021-07-22
Payer: MEDICARE

## 2021-07-22 VITALS
DIASTOLIC BLOOD PRESSURE: 86 MMHG | OXYGEN SATURATION: 98 % | RESPIRATION RATE: 18 BRPM | SYSTOLIC BLOOD PRESSURE: 137 MMHG | HEIGHT: 72 IN | TEMPERATURE: 98 F | BODY MASS INDEX: 24.65 KG/M2 | WEIGHT: 182 LBS | HEART RATE: 93 BPM

## 2021-07-22 LAB — SARS-COV-2 RNA RESP QL NAA+PROBE: NOT DETECTED

## 2021-07-22 PROCEDURE — 08953ZZ DRAINAGE OF LEFT VITREOUS, PERCUTANEOUS APPROACH: ICD-10-PCS | Performed by: OPHTHALMOLOGY

## 2021-07-22 PROCEDURE — 3E0C3BZ INTRODUCTION OF ANESTHETIC AGENT INTO EYE, PERCUTANEOUS APPROACH: ICD-10-PCS | Performed by: OPHTHALMOLOGY

## 2021-07-22 DEVICE — SPONGE SCLR 80X5X2.5MM SIL HLF: Type: IMPLANTABLE DEVICE | Status: FUNCTIONAL

## 2021-07-22 RX ORDER — MORPHINE SULFATE 4 MG/ML
2 INJECTION, SOLUTION INTRAMUSCULAR; INTRAVENOUS EVERY 10 MIN PRN
Status: DISCONTINUED | OUTPATIENT
Start: 2021-07-22 | End: 2021-07-22

## 2021-07-22 RX ORDER — ATROPINE SULFATE 10 MG/ML
1 SOLUTION/ DROPS OPHTHALMIC AS NEEDED
Status: DISCONTINUED | OUTPATIENT
Start: 2021-07-22 | End: 2021-07-22 | Stop reason: HOSPADM

## 2021-07-22 RX ORDER — PHENYLEPHRINE HCL 2.5 %
2 DROPS OPHTHALMIC (EYE) AS NEEDED
Status: DISCONTINUED | OUTPATIENT
Start: 2021-07-22 | End: 2021-07-22 | Stop reason: HOSPADM

## 2021-07-22 RX ORDER — HYDROMORPHONE HYDROCHLORIDE 1 MG/ML
0.6 INJECTION, SOLUTION INTRAMUSCULAR; INTRAVENOUS; SUBCUTANEOUS EVERY 5 MIN PRN
Status: DISCONTINUED | OUTPATIENT
Start: 2021-07-22 | End: 2021-07-22

## 2021-07-22 RX ORDER — PROCHLORPERAZINE EDISYLATE 5 MG/ML
5 INJECTION INTRAMUSCULAR; INTRAVENOUS ONCE AS NEEDED
Status: DISCONTINUED | OUTPATIENT
Start: 2021-07-22 | End: 2021-07-22

## 2021-07-22 RX ORDER — LIDOCAINE HYDROCHLORIDE 10 MG/ML
INJECTION, SOLUTION EPIDURAL; INFILTRATION; INTRACAUDAL; PERINEURAL AS NEEDED
Status: DISCONTINUED | OUTPATIENT
Start: 2021-07-22 | End: 2021-07-22 | Stop reason: SURG

## 2021-07-22 RX ORDER — HYDROMORPHONE HYDROCHLORIDE 1 MG/ML
0.4 INJECTION, SOLUTION INTRAMUSCULAR; INTRAVENOUS; SUBCUTANEOUS EVERY 5 MIN PRN
Status: DISCONTINUED | OUTPATIENT
Start: 2021-07-22 | End: 2021-07-22

## 2021-07-22 RX ORDER — SODIUM CHLORIDE, SODIUM LACTATE, POTASSIUM CHLORIDE, CALCIUM CHLORIDE 600; 310; 30; 20 MG/100ML; MG/100ML; MG/100ML; MG/100ML
INJECTION, SOLUTION INTRAVENOUS CONTINUOUS
Status: DISCONTINUED | OUTPATIENT
Start: 2021-07-22 | End: 2021-07-22

## 2021-07-22 RX ORDER — MIDAZOLAM HYDROCHLORIDE 1 MG/ML
INJECTION INTRAMUSCULAR; INTRAVENOUS AS NEEDED
Status: DISCONTINUED | OUTPATIENT
Start: 2021-07-22 | End: 2021-07-22 | Stop reason: SURG

## 2021-07-22 RX ORDER — MORPHINE SULFATE 4 MG/ML
4 INJECTION, SOLUTION INTRAMUSCULAR; INTRAVENOUS EVERY 10 MIN PRN
Status: DISCONTINUED | OUTPATIENT
Start: 2021-07-22 | End: 2021-07-22

## 2021-07-22 RX ORDER — NALOXONE HYDROCHLORIDE 0.4 MG/ML
80 INJECTION, SOLUTION INTRAMUSCULAR; INTRAVENOUS; SUBCUTANEOUS AS NEEDED
Status: DISCONTINUED | OUTPATIENT
Start: 2021-07-22 | End: 2021-07-22

## 2021-07-22 RX ORDER — ONDANSETRON 2 MG/ML
4 INJECTION INTRAMUSCULAR; INTRAVENOUS ONCE AS NEEDED
Status: DISCONTINUED | OUTPATIENT
Start: 2021-07-22 | End: 2021-07-22

## 2021-07-22 RX ORDER — GENTAMICIN SULFATE 3 MG/ML
2 SOLUTION/ DROPS OPHTHALMIC AS NEEDED
Status: DISCONTINUED | OUTPATIENT
Start: 2021-07-22 | End: 2021-07-22 | Stop reason: HOSPADM

## 2021-07-22 RX ORDER — ACETAMINOPHEN 500 MG
1000 TABLET ORAL ONCE
Status: COMPLETED | OUTPATIENT
Start: 2021-07-22 | End: 2021-07-22

## 2021-07-22 RX ORDER — HALOPERIDOL 5 MG/ML
0.25 INJECTION INTRAMUSCULAR ONCE AS NEEDED
Status: DISCONTINUED | OUTPATIENT
Start: 2021-07-22 | End: 2021-07-22

## 2021-07-22 RX ORDER — MORPHINE SULFATE 10 MG/ML
6 INJECTION, SOLUTION INTRAMUSCULAR; INTRAVENOUS EVERY 10 MIN PRN
Status: DISCONTINUED | OUTPATIENT
Start: 2021-07-22 | End: 2021-07-22

## 2021-07-22 RX ORDER — HYDROCODONE BITARTRATE AND ACETAMINOPHEN 5; 325 MG/1; MG/1
2 TABLET ORAL AS NEEDED
Status: DISCONTINUED | OUTPATIENT
Start: 2021-07-22 | End: 2021-07-22

## 2021-07-22 RX ORDER — DEXAMETHASONE SODIUM PHOSPHATE 4 MG/ML
VIAL (ML) INJECTION AS NEEDED
Status: DISCONTINUED | OUTPATIENT
Start: 2021-07-22 | End: 2021-07-22 | Stop reason: SURG

## 2021-07-22 RX ORDER — HYDROMORPHONE HYDROCHLORIDE 1 MG/ML
0.2 INJECTION, SOLUTION INTRAMUSCULAR; INTRAVENOUS; SUBCUTANEOUS EVERY 5 MIN PRN
Status: DISCONTINUED | OUTPATIENT
Start: 2021-07-22 | End: 2021-07-22

## 2021-07-22 RX ORDER — ONDANSETRON 2 MG/ML
INJECTION INTRAMUSCULAR; INTRAVENOUS AS NEEDED
Status: DISCONTINUED | OUTPATIENT
Start: 2021-07-22 | End: 2021-07-22 | Stop reason: SURG

## 2021-07-22 RX ORDER — HYDROCODONE BITARTRATE AND ACETAMINOPHEN 5; 325 MG/1; MG/1
1 TABLET ORAL AS NEEDED
Status: DISCONTINUED | OUTPATIENT
Start: 2021-07-22 | End: 2021-07-22

## 2021-07-22 RX ORDER — ATROPINE SULFATE 10 MG/ML
SOLUTION/ DROPS OPHTHALMIC AS NEEDED
Status: DISCONTINUED | OUTPATIENT
Start: 2021-07-22 | End: 2021-07-22 | Stop reason: HOSPADM

## 2021-07-22 RX ORDER — BALANCED SALT SOLUTION 6.4; .75; .48; .3; 3.9; 1.7 MG/ML; MG/ML; MG/ML; MG/ML; MG/ML; MG/ML
SOLUTION OPHTHALMIC AS NEEDED
Status: DISCONTINUED | OUTPATIENT
Start: 2021-07-22 | End: 2021-07-22 | Stop reason: HOSPADM

## 2021-07-22 RX ADMIN — SODIUM CHLORIDE, SODIUM LACTATE, POTASSIUM CHLORIDE, CALCIUM CHLORIDE: 600; 310; 30; 20 INJECTION, SOLUTION INTRAVENOUS at 16:24:00

## 2021-07-22 RX ADMIN — MIDAZOLAM HYDROCHLORIDE 2 MG: 1 INJECTION INTRAMUSCULAR; INTRAVENOUS at 14:40:00

## 2021-07-22 RX ADMIN — LIDOCAINE HYDROCHLORIDE 50 MG: 10 INJECTION, SOLUTION EPIDURAL; INFILTRATION; INTRACAUDAL; PERINEURAL at 14:42:00

## 2021-07-22 RX ADMIN — DEXAMETHASONE SODIUM PHOSPHATE 8 MG: 4 MG/ML VIAL (ML) INJECTION at 14:50:00

## 2021-07-22 RX ADMIN — ONDANSETRON 4 MG: 2 INJECTION INTRAMUSCULAR; INTRAVENOUS at 16:08:00

## 2021-07-22 RX ADMIN — SODIUM CHLORIDE, SODIUM LACTATE, POTASSIUM CHLORIDE, CALCIUM CHLORIDE: 600; 310; 30; 20 INJECTION, SOLUTION INTRAVENOUS at 14:38:00

## 2021-07-22 NOTE — ANESTHESIA PROCEDURE NOTES
Airway  Date/Time: 7/22/2021 2:44 PM  Urgency: Elective    Airway not difficult    General Information and Staff    Patient location during procedure: OR  Anesthesiologist: Mulugeta Golden MD  Resident/CRNA: Jose Davies CRNA  Performed: CRNA     I

## 2021-07-22 NOTE — ANESTHESIA PREPROCEDURE EVALUATION
Anesthesia PreOp Note    HPI:     Jill Tadeo is a 73year old male who presents for preoperative consultation requested by: Mani Pradhan MD    Date of Surgery: 7/22/2021    Procedure(s):  scleral buckle cryoretinopexy, pars plana vitrectomy, retino Allergies    Family History   Problem Relation Age of Onset   • Skin cancer Father      Social History    Socioeconomic History      Marital status: Single      Spouse name: Not on file      Number of children: Not on file      Years of education: Not on f 06/07/2021    RBC 5.37 06/07/2021    HGB 16.1 06/07/2021    HCT 47.6 06/07/2021    MCV 88.6 06/07/2021    MCH 30.0 06/07/2021    MCHC 33.8 06/07/2021    RDW 11.9 06/07/2021    .0 06/07/2021             Vital Signs: Body mass index is 24.95 kg/m².

## 2021-07-22 NOTE — BRIEF OP NOTE
Pre-Operative Diagnosis: retinal detachment left eye     Post-Operative Diagnosis: retinal detachment left eye     Procedure Performed: Scleral Buckling, Cryoretinopexy, Pars Plana Vitrectomy, Retinotomy, Gas/Fluid Exchange, 14% C3F8 injection left eye

## 2021-07-22 NOTE — ANESTHESIA POSTPROCEDURE EVALUATION
Patient: Geneva Renteria    Procedure Summary     Date: 07/22/21 Room / Location: [address] [place] [address] MAIN OR 03 / 300 [facility] OR    Anesthesia Start: 2983 Anesthesia Stop: 3470    Procedures:       scleral buckle cryoretinopexy, pars plana vitrectomy, retinotomy, gas fluid exc

## 2021-07-23 NOTE — OPERATIVE REPORT
[facility]    PATIENT'S NAME: J CARLOS SANCHEZ   ATTENDING PHYSICIAN: Leydi Gupta MD   OPERATING PHYSICIAN: Jarvis Sparrow.  Susana Gupta MD   PATIENT ACCOUNT#:   [id number]    LOCATION:  [facility] 6 [facility] 10  MEDICAL RECORD #:   [id number]       DATE Lakeisha Mann Visualization of the retina structures was somewhat limited by the anterior segment changes. A previously noted choroidal detachment had resolved. Laser photocoagulation scarring could be seen anteriorly in the nasal, inferior, and temporal quadrants.   A was placed between the lids of the left eye. Of note, the patient received Decadron 8 mg intravenously via Anesthesiology to help control both intraoperative and postoperative inflammation.   A 360-degree conjunctival peritomy was performed using a pair of mmHg and cutting set by 20,000 cuts per minute, an anterior vitrectomy was performed. The previously placed gas bubble was removed, and the posterior capsular haze was opened to the level of the optic. The Aurelia Siemens was then removed.   The infusion cannula frequently as possible. Once the scleral buckle had been fully placed, the infusion cannula was then replaced through the inferotemporal trocar.   The position of the infusion cannula was identified within the vitreous cavity and found to be free of retina superotemporal sclerotomy site. The infusion cannula was then connected to a 60 mL syringe filled with a 14% mixture of filtered perfluoropropane gas.   Approximately 50 mL of the gas was irrigated through the vitreous cavity with air being allowed to [employer]

## 2021-08-05 NOTE — H&P
See Previous H & P..  No ACE-I order (720h ago, onward)     Start     Ordered    07/22/21 1241  No ACE Inhibitors (ACE-I) Until Discontinued  Until discontinued,   Status:  Canceled     Comments:  The evening before surgery and the morning of surgery   Orde

## 2021-09-13 ENCOUNTER — LAB ENCOUNTER (OUTPATIENT)
Dept: LAB | Age: 71
End: 2021-09-13
Attending: FAMILY MEDICINE
Payer: MEDICARE

## 2021-09-13 ENCOUNTER — OFFICE VISIT (OUTPATIENT)
Dept: FAMILY MEDICINE CLINIC | Facility: CLINIC | Age: 71
End: 2021-09-13
Payer: MEDICARE

## 2021-09-13 VITALS
RESPIRATION RATE: 18 BRPM | BODY MASS INDEX: 24.24 KG/M2 | HEART RATE: 99 BPM | WEIGHT: 179 LBS | HEIGHT: 72 IN | TEMPERATURE: 97 F | DIASTOLIC BLOOD PRESSURE: 77 MMHG | SYSTOLIC BLOOD PRESSURE: 126 MMHG

## 2021-09-13 DIAGNOSIS — Z01.84 COVID-19 VIRUS IGG ANTIBODY TEST RESULT UNKNOWN: ICD-10-CM

## 2021-09-13 DIAGNOSIS — Z12.11 COLON CANCER SCREENING: ICD-10-CM

## 2021-09-13 DIAGNOSIS — L98.9 SKIN LESION OF LEFT LEG: Primary | ICD-10-CM

## 2021-09-13 LAB — SARS-COV-2 IGG+IGM SERPL QL IA: NONREACTIVE

## 2021-09-13 PROCEDURE — 3074F SYST BP LT 130 MM HG: CPT | Performed by: FAMILY MEDICINE

## 2021-09-13 PROCEDURE — 3078F DIAST BP <80 MM HG: CPT | Performed by: FAMILY MEDICINE

## 2021-09-13 PROCEDURE — 36415 COLL VENOUS BLD VENIPUNCTURE: CPT

## 2021-09-13 PROCEDURE — 3008F BODY MASS INDEX DOCD: CPT | Performed by: FAMILY MEDICINE

## 2021-09-13 PROCEDURE — 99213 OFFICE O/P EST LOW 20 MIN: CPT | Performed by: FAMILY MEDICINE

## 2021-09-13 PROCEDURE — 86769 SARS-COV-2 COVID-19 ANTIBODY: CPT

## 2021-09-13 RX ORDER — DORZOLAMIDE HYDROCHLORIDE AND TIMOLOL MALEATE 20; 5 MG/ML; MG/ML
1 SOLUTION/ DROPS OPHTHALMIC 2 TIMES DAILY
COMMUNITY
Start: 2021-07-23

## 2021-09-13 RX ORDER — BRIMONIDINE TARTRATE 2 MG/ML
1 SOLUTION/ DROPS OPHTHALMIC 2 TIMES DAILY
COMMUNITY
Start: 2021-07-23

## 2021-09-13 RX ORDER — PREDNISOLONE ACETATE 10 MG/ML
SUSPENSION/ DROPS OPHTHALMIC
COMMUNITY
Start: 2021-08-28

## 2021-09-13 RX ORDER — GENTAMICIN SULFATE 3 MG/ML
SOLUTION/ DROPS OPHTHALMIC
COMMUNITY
Start: 2021-06-11

## 2021-09-13 RX ORDER — ATROPINE SULFATE 10 MG/ML
SOLUTION/ DROPS OPHTHALMIC
COMMUNITY
Start: 2021-08-28

## 2021-09-13 NOTE — PROGRESS NOTES
Subjective:   Patient ID: Yael Piedra is a 73year old male. Pt presents with a skin lesion of the left leg that he noticed that it seems to be bigger now and changed in color. Pt states it did scab recently. Pt needs referral for dermatology.  Also susie INTERNAL

## 2021-09-20 ENCOUNTER — OFFICE VISIT (OUTPATIENT)
Dept: FAMILY MEDICINE CLINIC | Facility: CLINIC | Age: 71
End: 2021-09-20
Payer: MEDICARE

## 2021-09-20 ENCOUNTER — LAB ENCOUNTER (OUTPATIENT)
Dept: LAB | Age: 71
End: 2021-09-20
Attending: FAMILY MEDICINE
Payer: MEDICARE

## 2021-09-20 VITALS
BODY MASS INDEX: 24.04 KG/M2 | TEMPERATURE: 97 F | RESPIRATION RATE: 18 BRPM | DIASTOLIC BLOOD PRESSURE: 84 MMHG | HEIGHT: 72 IN | WEIGHT: 177.5 LBS | HEART RATE: 97 BPM | SYSTOLIC BLOOD PRESSURE: 125 MMHG

## 2021-09-20 DIAGNOSIS — Z12.5 SCREENING PSA (PROSTATE SPECIFIC ANTIGEN): ICD-10-CM

## 2021-09-20 DIAGNOSIS — E78.00 ELEVATED CHOLESTEROL: ICD-10-CM

## 2021-09-20 DIAGNOSIS — Z00.00 ENCOUNTER FOR ANNUAL HEALTH EXAMINATION: ICD-10-CM

## 2021-09-20 DIAGNOSIS — Z00.00 MEDICARE ANNUAL WELLNESS VISIT, SUBSEQUENT: Primary | ICD-10-CM

## 2021-09-20 LAB
ALBUMIN SERPL-MCNC: 3.9 G/DL (ref 3.4–5)
ALBUMIN/GLOB SERPL: 1.1 {RATIO} (ref 1–2)
ALP LIVER SERPL-CCNC: 72 U/L
ALT SERPL-CCNC: 25 U/L
ANION GAP SERPL CALC-SCNC: 7 MMOL/L (ref 0–18)
AST SERPL-CCNC: 17 U/L (ref 15–37)
BILIRUB SERPL-MCNC: 0.9 MG/DL (ref 0.1–2)
BUN BLD-MCNC: 20 MG/DL (ref 7–18)
BUN/CREAT SERPL: 18.7 (ref 10–20)
CALCIUM BLD-MCNC: 9.2 MG/DL (ref 8.5–10.1)
CHLORIDE SERPL-SCNC: 106 MMOL/L (ref 98–112)
CHOLEST SERPL-MCNC: 261 MG/DL (ref ?–200)
CO2 SERPL-SCNC: 26 MMOL/L (ref 21–32)
COMPLEXED PSA SERPL-MCNC: 1.38 NG/ML (ref ?–4)
CREAT BLD-MCNC: 1.07 MG/DL
GLOBULIN PLAS-MCNC: 3.6 G/DL (ref 2.8–4.4)
GLUCOSE BLD-MCNC: 93 MG/DL (ref 70–99)
HDLC SERPL-MCNC: 108 MG/DL (ref 40–59)
LDLC SERPL CALC-MCNC: 141 MG/DL (ref ?–100)
NONHDLC SERPL-MCNC: 153 MG/DL (ref ?–130)
OSMOLALITY SERPL CALC.SUM OF ELEC: 290 MOSM/KG (ref 275–295)
PATIENT FASTING Y/N/NP: YES
PATIENT FASTING Y/N/NP: YES
POTASSIUM SERPL-SCNC: 4.3 MMOL/L (ref 3.5–5.1)
PROT SERPL-MCNC: 7.5 G/DL (ref 6.4–8.2)
SODIUM SERPL-SCNC: 139 MMOL/L (ref 136–145)
TRIGL SERPL-MCNC: 74 MG/DL (ref 30–149)
VLDLC SERPL CALC-MCNC: 14 MG/DL (ref 0–30)

## 2021-09-20 PROCEDURE — G0439 PPPS, SUBSEQ VISIT: HCPCS | Performed by: FAMILY MEDICINE

## 2021-09-20 PROCEDURE — 36415 COLL VENOUS BLD VENIPUNCTURE: CPT

## 2021-09-20 PROCEDURE — 99397 PER PM REEVAL EST PAT 65+ YR: CPT | Performed by: FAMILY MEDICINE

## 2021-09-20 PROCEDURE — 3074F SYST BP LT 130 MM HG: CPT | Performed by: FAMILY MEDICINE

## 2021-09-20 PROCEDURE — 80061 LIPID PANEL: CPT

## 2021-09-20 PROCEDURE — 3008F BODY MASS INDEX DOCD: CPT | Performed by: FAMILY MEDICINE

## 2021-09-20 PROCEDURE — 96160 PT-FOCUSED HLTH RISK ASSMT: CPT | Performed by: FAMILY MEDICINE

## 2021-09-20 PROCEDURE — 80053 COMPREHEN METABOLIC PANEL: CPT

## 2021-09-20 PROCEDURE — 3079F DIAST BP 80-89 MM HG: CPT | Performed by: FAMILY MEDICINE

## 2021-09-20 NOTE — PATIENT INSTRUCTIONS
Nasir Parra's SCREENING SCHEDULE   Tests on this list are recommended by your physician but may not be covered, or covered at this frequency, by your insurer. Please check with your insurance carrier before scheduling to verify coverage.    PREVENTATIV [id number]) covered once after 65 Prevnar 13: -    [id number]: -     Pneumococcal Vaccination(1 of 1 - PPSV23) Never done    Hepatitis B One screening covered for patients with certain risk factors   -  No recommendations at this time    Tetanus Toxoid N

## 2021-09-20 NOTE — PROGRESS NOTES
HPI:   Cristobal Nielson is a 73year old male who presents for a Medicare Subsequent Annual Wellness visit (Pt already had Initial Annual Wellness). Patient is here for routine physical exam and medicare annual check up. No acute issues.  Chronic active prob 11/20/2019          Last Chemistry Labs:   Lab Results   Component Value Date    AST 21 11/20/2019    ALT 34 11/20/2019    CA 9.2 11/20/2019    ALB 3.9 11/20/2019    CREATSERUM 1.07 11/20/2019    GLU 97 11/20/2019        CBC  (most recent labs)   Lab Resul Negative. Negative for arthralgias. Skin: Negative. Neurological: Negative. Negative for dizziness and light-headedness. Psychiatric/Behavioral: Negative. Negative for dysphoric mood. The patient is not nervous/anxious.           EXAM:   /84 Head: Normocephalic.       Right Ear: Tympanic membrane, ear canal and external ear normal.      Left Ear: Tympanic membrane, ear canal and external ear normal.      Nose: Nose normal.      Mouth/Throat:      Mouth: Mucous membranes are moist.      Pharynx: testing. Routine follow up with his eye doctor for treatment of eye. Answered questions about COVID/ discussed vaccine. Elevated cholesterol:  - Stable; No side effects with medication; To call if problems. Will check lipid panel.  Follow up and further Panel  Cholesterol  Lipoprotein (HDL)  Triglycerides Covered every 5 years for all Medicare beneficiaries without apparent signs or symptoms of cardiovascular disease Lab Results   Component Value Date    CHOLEST 255 (H) 11/20/2019    HDL 84 (H) 11/20/2019

## 2021-10-29 ENCOUNTER — OFFICE VISIT (OUTPATIENT)
Dept: DERMATOLOGY CLINIC | Facility: CLINIC | Age: 71
End: 2021-10-29
Payer: MEDICARE

## 2021-10-29 DIAGNOSIS — D23.30 BENIGN NEOPLASM OF SKIN OF FACE: Primary | ICD-10-CM

## 2021-10-29 DIAGNOSIS — D23.9 BENIGN NEOPLASM OF SKIN, UNSPECIFIED LOCATION: ICD-10-CM

## 2021-10-29 DIAGNOSIS — Z85.828 HISTORY OF NONMELANOMA SKIN CANCER: ICD-10-CM

## 2021-10-29 DIAGNOSIS — L30.9 DERMATITIS: ICD-10-CM

## 2021-10-29 PROCEDURE — 99203 OFFICE O/P NEW LOW 30 MIN: CPT | Performed by: DERMATOLOGY

## 2021-11-10 ENCOUNTER — TELEPHONE (OUTPATIENT)
Dept: CASE MANAGEMENT | Age: 71
End: 2021-11-10

## 2021-11-10 DIAGNOSIS — H26.9 CATARACT OF LEFT EYE, UNSPECIFIED CATARACT TYPE: Primary | ICD-10-CM

## 2021-11-10 NOTE — TELEPHONE ENCOUNTER
Referral request noted. Referral approved, signed and sent to [facility].

## 2021-11-10 NOTE — TELEPHONE ENCOUNTER
Dr. Moon Yarbrough,    The patient called requesting referral to Dr. Janie William for follow up appointments. The patient has an appointment 11/11/21. Pended referral please review diagnosis and sign off if you agree. Thank you.   Maycol Mitchell

## 2021-11-30 ENCOUNTER — MED REC SCAN ONLY (OUTPATIENT)
Dept: FAMILY MEDICINE CLINIC | Facility: CLINIC | Age: 71
End: 2021-11-30

## 2021-12-09 NOTE — H&P
[facility], [facility] - Gastroenterology                                                                                                  Clinic History and Physical Ophthalmic Solution      • brimonidine Tartrate 0.2 % Ophthalmic Solution Place 1 drop into the left eye 2 (two) times daily. • Dorzolamide HCl-Timolol Mal 22.3-6.8 MG/ML Ophthalmic Solution Place 1 drop into the left eye 2 (two) times daily.      • Gen hospitalization, surgical intervention, or could be life threatening. I also specifically mentioned the risk of missed lesions/polyps. All questions were answered to the patient’s satisfaction.  The patient signed informed consent and elected to proceed wit

## 2021-12-10 ENCOUNTER — TELEPHONE (OUTPATIENT)
Dept: GASTROENTEROLOGY | Facility: CLINIC | Age: 71
End: 2021-12-10

## 2021-12-10 ENCOUNTER — OFFICE VISIT (OUTPATIENT)
Dept: GASTROENTEROLOGY | Facility: CLINIC | Age: 71
End: 2021-12-10
Payer: MEDICARE

## 2021-12-10 VITALS
DIASTOLIC BLOOD PRESSURE: 87 MMHG | BODY MASS INDEX: 24.79 KG/M2 | HEART RATE: 105 BPM | WEIGHT: 183 LBS | SYSTOLIC BLOOD PRESSURE: 144 MMHG | HEIGHT: 72 IN

## 2021-12-10 DIAGNOSIS — Z80.0 FAMILY HISTORY OF COLON CANCER: Primary | ICD-10-CM

## 2021-12-10 DIAGNOSIS — Z12.11 ENCOUNTER FOR COLONOSCOPY DUE TO HISTORY OF ADENOMATOUS COLONIC POLYPS: ICD-10-CM

## 2021-12-10 DIAGNOSIS — Z86.010 HISTORY OF COLON POLYPS: Primary | ICD-10-CM

## 2021-12-10 DIAGNOSIS — Z86.010 ENCOUNTER FOR COLONOSCOPY DUE TO HISTORY OF ADENOMATOUS COLONIC POLYPS: ICD-10-CM

## 2021-12-10 DIAGNOSIS — Z80.0 FH: COLON CANCER: ICD-10-CM

## 2021-12-10 PROCEDURE — 3008F BODY MASS INDEX DOCD: CPT | Performed by: INTERNAL MEDICINE

## 2021-12-10 PROCEDURE — 99203 OFFICE O/P NEW LOW 30 MIN: CPT | Performed by: INTERNAL MEDICINE

## 2021-12-10 PROCEDURE — 3077F SYST BP >= 140 MM HG: CPT | Performed by: INTERNAL MEDICINE

## 2021-12-10 PROCEDURE — 3079F DIAST BP 80-89 MM HG: CPT | Performed by: INTERNAL MEDICINE

## 2021-12-10 NOTE — TELEPHONE ENCOUNTER
Scheduled for:  Colonoscopy 32110  Provider Name:  Dr Humble Macedo  Date:  01/26/2022  Location:  [facility]  Sedation:  MAC  Time:  8026 (pt is aware that Leighien 150 will call the day before to confirm arrival time)  Prep:  COLYTE  Meds/Allergies Reconciled?:  Physician rachael

## 2021-12-10 NOTE — PATIENT INSTRUCTIONS
1. Schedule colonoscopy with MAC with Dr. Jay Mccann at the [facility] or [facility]    2.  bowel prep from pharmacy (split trilyte or golytely).  As we discussed it is important to take the bowel preparation in two parts taking 2L of the liquid the night be

## 2022-01-11 ENCOUNTER — TELEPHONE (OUTPATIENT)
Dept: FAMILY MEDICINE CLINIC | Facility: CLINIC | Age: 72
End: 2022-01-11

## 2022-01-11 DIAGNOSIS — J06.9 ACUTE URI: Primary | ICD-10-CM

## 2022-01-11 NOTE — TELEPHONE ENCOUNTER
Patient needs a covid test order. Has mild cold symptoms. Please call the patient when the order is in.

## 2022-01-12 ENCOUNTER — LAB ENCOUNTER (OUTPATIENT)
Dept: LAB | Facility: HOSPITAL | Age: 72
End: 2022-01-12
Attending: FAMILY MEDICINE
Payer: MEDICARE

## 2022-01-12 DIAGNOSIS — J06.9 ACUTE URI: ICD-10-CM

## 2022-01-13 LAB — SARS-COV-2 RNA RESP QL NAA+PROBE: DETECTED

## 2022-01-17 ENCOUNTER — TELEPHONE (OUTPATIENT)
Dept: CASE MANAGEMENT | Age: 72
End: 2022-01-17

## 2022-01-18 ENCOUNTER — TELEPHONE (OUTPATIENT)
Dept: GASTROENTEROLOGY | Facility: CLINIC | Age: 72
End: 2022-01-18

## 2022-01-19 NOTE — TELEPHONE ENCOUNTER
Received email from [facility] stating patient was positive as well and they removed from [employer]. I removed from appt desk in [address] Please call to reschedule.

## 2022-01-20 ENCOUNTER — TELEPHONE (OUTPATIENT)
Dept: GASTROENTEROLOGY | Facility: CLINIC | Age: 72
End: 2022-01-20

## 2022-01-20 DIAGNOSIS — Z80.0 FAMILY HISTORY OF COLON CANCER: Primary | ICD-10-CM

## 2022-01-20 DIAGNOSIS — Z86.010 PERSONAL HISTORY OF COLONIC POLYPS: ICD-10-CM

## 2022-01-20 NOTE — TELEPHONE ENCOUNTER
Cancelled for:  Colonoscopy 52382  Provider Name:  Dr Chrissy Holland  Date:  01/26/2022  Location:  [facility]  Sedation:  MAC  Time:  1115    Prep:  COLYTE  Meds/Allergies Reconciled?:  Physician reviewed  Diagnosis with codes:   fhcc z80.0; hx of colon polyps z86. 01

## 2022-01-20 NOTE — TELEPHONE ENCOUNTER
Received email from [facility] stating patient was positive as well and they removed from [employer].      I removed from appt desk in [address]      Please call to reschedule.

## 2022-01-20 NOTE — TELEPHONE ENCOUNTER
Scheduled for:  Colonoscopy 79415  Provider Name:  Dr. Radha Delacruz  Date:  2/14/22  Location:    [facility]  Sedation:  MAC  Time:  3457 (pt is aware to arrive at 1430)   Prep:  Colyte, mailed on 1/20/22  Meds/Allergies Reconciled?:  Physician reviewed   Diagnosis

## 2022-01-27 ENCOUNTER — TELEPHONE (OUTPATIENT)
Dept: GASTROENTEROLOGY | Facility: CLINIC | Age: 72
End: 2022-01-27

## 2022-01-27 DIAGNOSIS — Z80.0 FAMILY HISTORY OF COLON CANCER: Primary | ICD-10-CM

## 2022-01-27 DIAGNOSIS — Z86.010 PERSONAL HISTORY OF COLONIC POLYPS: ICD-10-CM

## 2022-01-27 NOTE — TELEPHONE ENCOUNTER
Rescheduled for:  Colonoscopy 31669  Provider Name:  Dr. Tanisha Ballard  Date:    From-2/14/22 To-3/14/22  Location:    [address] [place] [address]  Sedation:  MAC  Time:    From-1530  To-1315 (pt is aware to arrive at 1215)   Prep:  aTrun, mailed new instructions on 1/27/22  Meds/Al

## 2022-02-24 ENCOUNTER — TELEPHONE (OUTPATIENT)
Dept: FAMILY MEDICINE CLINIC | Facility: CLINIC | Age: 72
End: 2022-02-24

## 2022-02-24 DIAGNOSIS — Z86.69 HISTORY OF CATARACT: Primary | ICD-10-CM

## 2022-02-24 NOTE — TELEPHONE ENCOUNTER
Patient requesting a referral for Dr. Remy Ramirez at [facility]  Opthalmology for a follow up.

## 2022-02-25 NOTE — TELEPHONE ENCOUNTER
Please note dx that is appropriate. Please sign off if you agree with plan of care.     Thank you,  [facility]   Referral specialist

## 2022-03-08 ENCOUNTER — MED REC SCAN ONLY (OUTPATIENT)
Dept: FAMILY MEDICINE CLINIC | Facility: CLINIC | Age: 72
End: 2022-03-08

## 2022-03-08 NOTE — PAT NURSING NOTE
Pt.had Covid 1/13/22, per IC he doesn't need to be retested as he is within th 90 day window. Currently pt.is asymptomatic.

## 2022-03-14 ENCOUNTER — ANESTHESIA (OUTPATIENT)
Dept: ENDOSCOPY | Facility: HOSPITAL | Age: 72
End: 2022-03-14
Payer: MEDICARE

## 2022-03-14 ENCOUNTER — ANESTHESIA EVENT (OUTPATIENT)
Dept: ENDOSCOPY | Facility: HOSPITAL | Age: 72
End: 2022-03-14
Payer: MEDICARE

## 2022-03-14 ENCOUNTER — HOSPITAL ENCOUNTER (OUTPATIENT)
Facility: HOSPITAL | Age: 72
Setting detail: HOSPITAL OUTPATIENT SURGERY
Discharge: HOME | End: 2022-03-14
Attending: INTERNAL MEDICINE | Admitting: INTERNAL MEDICINE
Payer: MEDICARE

## 2022-03-14 VITALS
RESPIRATION RATE: 12 BRPM | WEIGHT: 185 LBS | HEIGHT: 72 IN | OXYGEN SATURATION: 96 % | SYSTOLIC BLOOD PRESSURE: 140 MMHG | DIASTOLIC BLOOD PRESSURE: 90 MMHG | BODY MASS INDEX: 25.06 KG/M2 | TEMPERATURE: 97 F | HEART RATE: 82 BPM

## 2022-03-14 DIAGNOSIS — Z86.010 PERSONAL HISTORY OF COLONIC POLYPS: ICD-10-CM

## 2022-03-14 DIAGNOSIS — Z80.0 FAMILY HISTORY OF COLON CANCER: ICD-10-CM

## 2022-03-14 PROCEDURE — 0DBK8ZX EXCISION OF ASCENDING COLON, VIA NATURAL OR ARTIFICIAL OPENING ENDOSCOPIC, DIAGNOSTIC: ICD-10-PCS | Performed by: INTERNAL MEDICINE

## 2022-03-14 PROCEDURE — 45380 COLONOSCOPY AND BIOPSY: CPT | Performed by: INTERNAL MEDICINE

## 2022-03-14 PROCEDURE — 0DBN8ZX EXCISION OF SIGMOID COLON, VIA NATURAL OR ARTIFICIAL OPENING ENDOSCOPIC, DIAGNOSTIC: ICD-10-PCS | Performed by: INTERNAL MEDICINE

## 2022-03-14 PROCEDURE — 0DBL8ZX EXCISION OF TRANSVERSE COLON, VIA NATURAL OR ARTIFICIAL OPENING ENDOSCOPIC, DIAGNOSTIC: ICD-10-PCS | Performed by: INTERNAL MEDICINE

## 2022-03-14 RX ORDER — LIDOCAINE HYDROCHLORIDE 10 MG/ML
INJECTION, SOLUTION EPIDURAL; INFILTRATION; INTRACAUDAL; PERINEURAL AS NEEDED
Status: DISCONTINUED | OUTPATIENT
Start: 2022-03-14 | End: 2022-03-14 | Stop reason: SURG

## 2022-03-14 RX ORDER — SODIUM CHLORIDE, SODIUM LACTATE, POTASSIUM CHLORIDE, CALCIUM CHLORIDE 600; 310; 30; 20 MG/100ML; MG/100ML; MG/100ML; MG/100ML
INJECTION, SOLUTION INTRAVENOUS CONTINUOUS
Status: DISCONTINUED | OUTPATIENT
Start: 2022-03-14 | End: 2022-03-14

## 2022-03-14 RX ADMIN — LIDOCAINE HYDROCHLORIDE 50 MG: 10 INJECTION, SOLUTION EPIDURAL; INFILTRATION; INTRACAUDAL; PERINEURAL at 13:50:00

## 2022-03-14 RX ADMIN — SODIUM CHLORIDE, SODIUM LACTATE, POTASSIUM CHLORIDE, CALCIUM CHLORIDE: 600; 310; 30; 20 INJECTION, SOLUTION INTRAVENOUS at 13:48:00

## 2022-03-14 NOTE — ANESTHESIA POSTPROCEDURE EVALUATION
Patient: Julius Richards    Procedure Summary     Date: 03/14/22 Room / Location: [address] [place] [address] ENDOSCOPY 01 / [address] [place] [address] ENDOSCOPY    Anesthesia Start: 7830 Anesthesia Stop: 9950    Procedure: COLONOSCOPY (N/A ) Diagnosis:       Family history of colon cancer      Personal history of colonic polyps      (polyps, hemerrhoids)    Surgeons: Joslyn Song MD Anesthesiologist: Guido Berger CRNA    Anesthesia Type: general, MAC ASA Status: 2          Anesthesia Type: general, MAC    Vitals Value Taken Time   /88 03/14/22 1416   Temp 98.6 03/14/22 1416   Pulse 86 03/14/22 1416   Resp 18 03/14/22 1416   SpO2 97 % 03/14/22 1416       [address] [place] [address] AN Post Evaluation:   Patient Evaluated in PACU  Patient Participation: complete - patient participated  Level of Consciousness: awake  Pain Score: 0  Pain Management: adequate  Airway Patency:patent  Dental exam unchanged from preop  Yes    Cardiovascular Status: acceptable  Respiratory Status: acceptable  Postoperative Hydration acceptable      Veronia Sandhoff, CRNA  3/14/2022 2:16 PM

## 2022-03-17 ENCOUNTER — TELEPHONE (OUTPATIENT)
Dept: GASTROENTEROLOGY | Facility: CLINIC | Age: 72
End: 2022-03-17

## 2022-03-17 NOTE — TELEPHONE ENCOUNTER
I mailed out colonoscopy results letter to pt  Updated health maintenance  Entered into  Yr recall    Landa Soulier, MD  [facility] Clinical Staff  GI staff: please place recall for colonoscopy in 3 years

## 2022-03-18 NOTE — TELEPHONE ENCOUNTER
I mailed out colonoscopy results letter to pt  Updated health maintenance  Entered into 3 yr CLN recall  Recall colon in 3 years per.  Colon done 3/14/2022     Odin Kern MD  [facility] Clinical Staff  GI staff: please place recall for colonoscopy in 3 years

## 2022-05-18 ENCOUNTER — TELEPHONE (OUTPATIENT)
Dept: FAMILY MEDICINE CLINIC | Facility: CLINIC | Age: 72
End: 2022-05-18

## 2022-05-18 NOTE — TELEPHONE ENCOUNTER
Patient states will call us back to schedule this appt with Dr. Manasa Matthew for medicare wellness.

## 2022-06-07 ENCOUNTER — TELEPHONE (OUTPATIENT)
Dept: CASE MANAGEMENT | Age: 72
End: 2022-06-07

## 2022-06-07 DIAGNOSIS — H53.2 DOUBLE VISION: Primary | ICD-10-CM

## 2022-06-07 NOTE — TELEPHONE ENCOUNTER
Dr. Deny Abraham,    The patient called requesting referral to Dr. Mary Jo Barbosa for double vision. Pended referral please review diagnosis and sign off if you agree. Thank you.   Maycol Mitchell

## 2022-06-14 ENCOUNTER — TELEPHONE (OUTPATIENT)
Dept: FAMILY MEDICINE CLINIC | Facility: CLINIC | Age: 72
End: 2022-06-14

## 2022-06-16 ENCOUNTER — MED REC SCAN ONLY (OUTPATIENT)
Dept: FAMILY MEDICINE CLINIC | Facility: CLINIC | Age: 72
End: 2022-06-16

## 2022-07-12 ENCOUNTER — MED REC SCAN ONLY (OUTPATIENT)
Dept: FAMILY MEDICINE CLINIC | Facility: CLINIC | Age: 72
End: 2022-07-12

## 2022-08-23 ENCOUNTER — TELEPHONE (OUTPATIENT)
Dept: INTERNAL MEDICINE CLINIC | Facility: CLINIC | Age: 72
End: 2022-08-23

## 2022-08-24 ENCOUNTER — MED REC SCAN ONLY (OUTPATIENT)
Dept: FAMILY MEDICINE CLINIC | Facility: CLINIC | Age: 72
End: 2022-08-24

## 2022-10-11 ENCOUNTER — TELEPHONE (OUTPATIENT)
Dept: FAMILY MEDICINE CLINIC | Facility: CLINIC | Age: 72
End: 2022-10-11

## 2022-11-09 ENCOUNTER — TELEPHONE (OUTPATIENT)
Dept: FAMILY MEDICINE CLINIC | Facility: CLINIC | Age: 72
End: 2022-11-09

## 2022-11-21 ENCOUNTER — OFFICE VISIT (OUTPATIENT)
Dept: FAMILY MEDICINE CLINIC | Facility: CLINIC | Age: 72
End: 2022-11-21
Payer: MEDICARE

## 2022-11-21 VITALS — WEIGHT: 185 LBS | BODY MASS INDEX: 25.06 KG/M2 | HEIGHT: 72 IN | TEMPERATURE: 98 F

## 2022-11-21 DIAGNOSIS — K40.90 RIGHT GROIN HERNIA: Primary | ICD-10-CM

## 2022-11-21 DIAGNOSIS — Z87.19 HISTORY OF HERNIA REPAIR: ICD-10-CM

## 2022-11-21 DIAGNOSIS — Z98.890 HISTORY OF HERNIA REPAIR: ICD-10-CM

## 2022-11-21 PROCEDURE — 1125F AMNT PAIN NOTED PAIN PRSNT: CPT | Performed by: FAMILY MEDICINE

## 2022-11-21 PROCEDURE — 99213 OFFICE O/P EST LOW 20 MIN: CPT | Performed by: FAMILY MEDICINE

## 2022-11-21 PROCEDURE — 3008F BODY MASS INDEX DOCD: CPT | Performed by: FAMILY MEDICINE

## 2022-11-28 NOTE — TELEPHONE ENCOUNTER
ETOH detox, Hospitalized in ICU at [facility]. CIWA=5.     Raman Aburto MD  11/28/22 9224     Message noted and may change visit for wellness or routine physical as pt came in for his medicare annual check up. Can forward to billing/ coding.

## 2022-11-29 ENCOUNTER — OFFICE VISIT (OUTPATIENT)
Dept: FAMILY MEDICINE CLINIC | Facility: CLINIC | Age: 72
End: 2022-11-29
Payer: MEDICARE

## 2022-11-29 ENCOUNTER — LAB ENCOUNTER (OUTPATIENT)
Dept: LAB | Age: 72
End: 2022-11-29
Attending: FAMILY MEDICINE
Payer: MEDICARE

## 2022-11-29 VITALS
HEIGHT: 72 IN | TEMPERATURE: 99 F | SYSTOLIC BLOOD PRESSURE: 138 MMHG | BODY MASS INDEX: 25.47 KG/M2 | DIASTOLIC BLOOD PRESSURE: 77 MMHG | RESPIRATION RATE: 16 BRPM | WEIGHT: 188 LBS | HEART RATE: 78 BPM

## 2022-11-29 DIAGNOSIS — E78.00 ELEVATED CHOLESTEROL: ICD-10-CM

## 2022-11-29 DIAGNOSIS — Z12.5 SCREENING PSA (PROSTATE SPECIFIC ANTIGEN): ICD-10-CM

## 2022-11-29 DIAGNOSIS — Z00.00 MEDICARE ANNUAL WELLNESS VISIT, SUBSEQUENT: Primary | ICD-10-CM

## 2022-11-29 DIAGNOSIS — Z00.00 ENCOUNTER FOR ANNUAL HEALTH EXAMINATION: ICD-10-CM

## 2022-11-29 LAB
ALBUMIN SERPL-MCNC: 3.9 G/DL (ref 3.4–5)
ALBUMIN/GLOB SERPL: 1.3 {RATIO} (ref 1–2)
ALP LIVER SERPL-CCNC: 77 U/L
ALT SERPL-CCNC: 29 U/L
ANION GAP SERPL CALC-SCNC: 7 MMOL/L (ref 0–18)
AST SERPL-CCNC: 19 U/L (ref 15–37)
BILIRUB SERPL-MCNC: 0.7 MG/DL (ref 0.1–2)
BUN BLD-MCNC: 16 MG/DL (ref 7–18)
BUN/CREAT SERPL: 18 (ref 10–20)
CALCIUM BLD-MCNC: 9.2 MG/DL (ref 8.5–10.1)
CHLORIDE SERPL-SCNC: 105 MMOL/L (ref 98–112)
CHOLEST SERPL-MCNC: 244 MG/DL (ref ?–200)
CO2 SERPL-SCNC: 27 MMOL/L (ref 21–32)
COMPLEXED PSA SERPL-MCNC: 1.72 NG/ML (ref ?–4)
CREAT BLD-MCNC: 0.89 MG/DL
DEPRECATED RDW RBC AUTO: 40.7 FL (ref 35.1–46.3)
ERYTHROCYTE [DISTWIDTH] IN BLOOD BY AUTOMATED COUNT: 12.4 % (ref 11–15)
FASTING PATIENT LIPID ANSWER: YES
FASTING STATUS PATIENT QL REPORTED: YES
GFR SERPLBLD BASED ON 1.73 SQ M-ARVRAT: 92 ML/MIN/1.73M2 (ref 60–?)
GLOBULIN PLAS-MCNC: 3.1 G/DL (ref 2.8–4.4)
GLUCOSE BLD-MCNC: 91 MG/DL (ref 70–99)
HCT VFR BLD AUTO: 47.9 %
HDLC SERPL-MCNC: 79 MG/DL (ref 40–59)
HGB BLD-MCNC: 15.8 G/DL
LDLC SERPL CALC-MCNC: 147 MG/DL (ref ?–100)
MCH RBC QN AUTO: 29.3 PG (ref 26–34)
MCHC RBC AUTO-ENTMCNC: 33 G/DL (ref 31–37)
MCV RBC AUTO: 88.7 FL
NONHDLC SERPL-MCNC: 165 MG/DL (ref ?–130)
OSMOLALITY SERPL CALC.SUM OF ELEC: 289 MOSM/KG (ref 275–295)
PLATELET # BLD AUTO: 258 10(3)UL (ref 150–450)
POTASSIUM SERPL-SCNC: 4.5 MMOL/L (ref 3.5–5.1)
PROT SERPL-MCNC: 7 G/DL (ref 6.4–8.2)
RBC # BLD AUTO: 5.4 X10(6)UL
SODIUM SERPL-SCNC: 139 MMOL/L (ref 136–145)
TRIGL SERPL-MCNC: 102 MG/DL (ref 30–149)
VLDLC SERPL CALC-MCNC: 19 MG/DL (ref 0–30)
WBC # BLD AUTO: 7.7 X10(3) UL (ref 4–11)

## 2022-11-29 PROCEDURE — 85027 COMPLETE CBC AUTOMATED: CPT

## 2022-11-29 PROCEDURE — 36415 COLL VENOUS BLD VENIPUNCTURE: CPT

## 2022-11-29 PROCEDURE — 80053 COMPREHEN METABOLIC PANEL: CPT

## 2022-11-29 PROCEDURE — 80061 LIPID PANEL: CPT

## 2022-12-12 ENCOUNTER — OFFICE VISIT (OUTPATIENT)
Dept: SURGERY | Facility: CLINIC | Age: 72
End: 2022-12-12
Payer: MEDICARE

## 2022-12-12 VITALS — HEIGHT: 72 IN | BODY MASS INDEX: 25.47 KG/M2 | WEIGHT: 188 LBS

## 2022-12-12 DIAGNOSIS — R10.32 LEFT GROIN PAIN: Primary | ICD-10-CM

## 2022-12-12 PROCEDURE — 3008F BODY MASS INDEX DOCD: CPT | Performed by: SURGERY

## 2022-12-12 PROCEDURE — 99204 OFFICE O/P NEW MOD 45 MIN: CPT | Performed by: SURGERY

## 2023-03-22 ENCOUNTER — TELEPHONE (OUTPATIENT)
Dept: FAMILY MEDICINE CLINIC | Facility: CLINIC | Age: 73
End: 2023-03-22

## 2023-04-05 ENCOUNTER — TELEPHONE (OUTPATIENT)
Dept: FAMILY MEDICINE CLINIC | Facility: CLINIC | Age: 73
End: 2023-04-05

## 2023-05-17 ENCOUNTER — TELEPHONE (OUTPATIENT)
Dept: FAMILY MEDICINE CLINIC | Facility: CLINIC | Age: 73
End: 2023-05-17

## 2023-05-31 ENCOUNTER — TELEPHONE (OUTPATIENT)
Dept: FAMILY MEDICINE CLINIC | Facility: CLINIC | Age: 73
End: 2023-05-31

## 2023-06-13 ENCOUNTER — TELEPHONE (OUTPATIENT)
Dept: FAMILY MEDICINE CLINIC | Facility: CLINIC | Age: 73
End: 2023-06-13

## 2023-06-15 ENCOUNTER — MED REC SCAN ONLY (OUTPATIENT)
Dept: FAMILY MEDICINE CLINIC | Facility: CLINIC | Age: 73
End: 2023-06-15

## 2023-07-13 ENCOUNTER — TELEPHONE (OUTPATIENT)
Dept: FAMILY MEDICINE CLINIC | Facility: CLINIC | Age: 73
End: 2023-07-13

## 2023-08-07 ENCOUNTER — TELEPHONE (OUTPATIENT)
Dept: CASE MANAGEMENT | Age: 73
End: 2023-08-07

## 2023-08-07 DIAGNOSIS — Z86.69 HISTORY OF CATARACT: Primary | ICD-10-CM

## 2023-08-07 NOTE — TELEPHONE ENCOUNTER
Dr. Faisal Cope,     Patient is requesting a referral to Dr. Huseyin Wilson referral please review diagnosis and sign off if you agree. Thank you.   Maycol Mitchell

## 2023-08-14 ENCOUNTER — TELEPHONE (OUTPATIENT)
Dept: FAMILY MEDICINE CLINIC | Facility: CLINIC | Age: 73
End: 2023-08-14

## 2023-08-17 ENCOUNTER — MED REC SCAN ONLY (OUTPATIENT)
Dept: FAMILY MEDICINE CLINIC | Facility: CLINIC | Age: 73
End: 2023-08-17

## 2023-09-26 ENCOUNTER — LAB ENCOUNTER (OUTPATIENT)
Dept: LAB | Age: 73
End: 2023-09-26
Attending: FAMILY MEDICINE
Payer: MEDICARE

## 2023-09-26 ENCOUNTER — OFFICE VISIT (OUTPATIENT)
Dept: FAMILY MEDICINE CLINIC | Facility: CLINIC | Age: 73
End: 2023-09-26

## 2023-09-26 VITALS
HEART RATE: 71 BPM | HEIGHT: 72 IN | DIASTOLIC BLOOD PRESSURE: 73 MMHG | WEIGHT: 192 LBS | BODY MASS INDEX: 26.01 KG/M2 | RESPIRATION RATE: 16 BRPM | SYSTOLIC BLOOD PRESSURE: 133 MMHG | TEMPERATURE: 98 F

## 2023-09-26 DIAGNOSIS — Z00.00 ENCOUNTER FOR ANNUAL HEALTH EXAMINATION: ICD-10-CM

## 2023-09-26 DIAGNOSIS — Z12.5 SCREENING PSA (PROSTATE SPECIFIC ANTIGEN): ICD-10-CM

## 2023-09-26 DIAGNOSIS — E78.00 ELEVATED CHOLESTEROL: ICD-10-CM

## 2023-09-26 DIAGNOSIS — Z00.00 MEDICARE ANNUAL WELLNESS VISIT, SUBSEQUENT: Primary | ICD-10-CM

## 2023-09-26 PROBLEM — Z85.828 HISTORY OF SKIN CANCER: Status: RESOLVED | Noted: 2018-12-18 | Resolved: 2023-09-26

## 2023-09-26 LAB
ALBUMIN SERPL-MCNC: 3.8 G/DL (ref 3.4–5)
ALBUMIN/GLOB SERPL: 1 {RATIO} (ref 1–2)
ALP LIVER SERPL-CCNC: 65 U/L
ALT SERPL-CCNC: 34 U/L
ANION GAP SERPL CALC-SCNC: 8 MMOL/L (ref 0–18)
AST SERPL-CCNC: 23 U/L (ref 15–37)
BILIRUB SERPL-MCNC: 0.7 MG/DL (ref 0.1–2)
BUN BLD-MCNC: 14 MG/DL (ref 7–18)
BUN/CREAT SERPL: 12.7 (ref 10–20)
CALCIUM BLD-MCNC: 9.1 MG/DL (ref 8.5–10.1)
CHLORIDE SERPL-SCNC: 108 MMOL/L (ref 98–112)
CHOLEST SERPL-MCNC: 244 MG/DL (ref ?–200)
CO2 SERPL-SCNC: 23 MMOL/L (ref 21–32)
COMPLEXED PSA SERPL-MCNC: 2.02 NG/ML (ref ?–4)
CREAT BLD-MCNC: 1.1 MG/DL
DEPRECATED RDW RBC AUTO: 38.4 FL (ref 35.1–46.3)
EGFRCR SERPLBLD CKD-EPI 2021: 72 ML/MIN/1.73M2 (ref 60–?)
ERYTHROCYTE [DISTWIDTH] IN BLOOD BY AUTOMATED COUNT: 11.9 % (ref 11–15)
FASTING PATIENT LIPID ANSWER: YES
FASTING STATUS PATIENT QL REPORTED: YES
GLOBULIN PLAS-MCNC: 3.8 G/DL (ref 2.8–4.4)
GLUCOSE BLD-MCNC: 95 MG/DL (ref 70–99)
HCT VFR BLD AUTO: 47.8 %
HDLC SERPL-MCNC: 79 MG/DL (ref 40–59)
HGB BLD-MCNC: 16.4 G/DL
LDLC SERPL CALC-MCNC: 146 MG/DL (ref ?–100)
MCH RBC QN AUTO: 30 PG (ref 26–34)
MCHC RBC AUTO-ENTMCNC: 34.3 G/DL (ref 31–37)
MCV RBC AUTO: 87.5 FL
NONHDLC SERPL-MCNC: 165 MG/DL (ref ?–130)
OSMOLALITY SERPL CALC.SUM OF ELEC: 288 MOSM/KG (ref 275–295)
PLATELET # BLD AUTO: 236 10(3)UL (ref 150–450)
POTASSIUM SERPL-SCNC: 3.9 MMOL/L (ref 3.5–5.1)
PROT SERPL-MCNC: 7.6 G/DL (ref 6.4–8.2)
RBC # BLD AUTO: 5.46 X10(6)UL
SODIUM SERPL-SCNC: 139 MMOL/L (ref 136–145)
TRIGL SERPL-MCNC: 108 MG/DL (ref 30–149)
VIT D+METAB SERPL-MCNC: 42 NG/ML (ref 30–100)
VLDLC SERPL CALC-MCNC: 20 MG/DL (ref 0–30)
WBC # BLD AUTO: 7 X10(3) UL (ref 4–11)

## 2023-09-26 PROCEDURE — 1125F AMNT PAIN NOTED PAIN PRSNT: CPT | Performed by: FAMILY MEDICINE

## 2023-09-26 PROCEDURE — G0439 PPPS, SUBSEQ VISIT: HCPCS | Performed by: FAMILY MEDICINE

## 2023-09-26 PROCEDURE — 1170F FXNL STATUS ASSESSED: CPT | Performed by: FAMILY MEDICINE

## 2023-09-26 PROCEDURE — 1159F MED LIST DOCD IN RCRD: CPT | Performed by: FAMILY MEDICINE

## 2023-09-26 PROCEDURE — 1160F RVW MEDS BY RX/DR IN RCRD: CPT | Performed by: FAMILY MEDICINE

## 2023-09-26 PROCEDURE — 3008F BODY MASS INDEX DOCD: CPT | Performed by: FAMILY MEDICINE

## 2023-09-26 PROCEDURE — 80061 LIPID PANEL: CPT

## 2023-09-26 PROCEDURE — 3075F SYST BP GE 130 - 139MM HG: CPT | Performed by: FAMILY MEDICINE

## 2023-09-26 PROCEDURE — 80053 COMPREHEN METABOLIC PANEL: CPT

## 2023-09-26 PROCEDURE — 3078F DIAST BP <80 MM HG: CPT | Performed by: FAMILY MEDICINE

## 2023-09-26 PROCEDURE — 36415 COLL VENOUS BLD VENIPUNCTURE: CPT

## 2023-09-26 PROCEDURE — 82306 VITAMIN D 25 HYDROXY: CPT

## 2023-09-26 PROCEDURE — 96160 PT-FOCUSED HLTH RISK ASSMT: CPT | Performed by: FAMILY MEDICINE

## 2023-09-26 PROCEDURE — 85027 COMPLETE CBC AUTOMATED: CPT

## 2023-11-13 ENCOUNTER — MED REC SCAN ONLY (OUTPATIENT)
Dept: FAMILY MEDICINE CLINIC | Facility: CLINIC | Age: 73
End: 2023-11-13

## 2023-12-19 ENCOUNTER — MED REC SCAN ONLY (OUTPATIENT)
Dept: FAMILY MEDICINE CLINIC | Facility: CLINIC | Age: 73
End: 2023-12-19

## 2024-01-22 ENCOUNTER — MED REC SCAN ONLY (OUTPATIENT)
Dept: FAMILY MEDICINE CLINIC | Facility: CLINIC | Age: 74
End: 2024-01-22

## 2024-03-05 ENCOUNTER — MED REC SCAN ONLY (OUTPATIENT)
Dept: FAMILY MEDICINE CLINIC | Facility: CLINIC | Age: 74
End: 2024-03-05

## 2024-04-15 ENCOUNTER — MED REC SCAN ONLY (OUTPATIENT)
Dept: FAMILY MEDICINE CLINIC | Facility: CLINIC | Age: 74
End: 2024-04-15

## 2024-05-03 ENCOUNTER — APPOINTMENT (OUTPATIENT)
Dept: GENERAL RADIOLOGY | Facility: HOSPITAL | Age: 74
DRG: 321 | End: 2024-05-03
Attending: EMERGENCY MEDICINE
Payer: MEDICARE

## 2024-05-03 ENCOUNTER — APPOINTMENT (OUTPATIENT)
Dept: INTERVENTIONAL RADIOLOGY/VASCULAR | Facility: HOSPITAL | Age: 74
DRG: 321 | End: 2024-05-03
Attending: INTERNAL MEDICINE
Payer: MEDICARE

## 2024-05-03 ENCOUNTER — HOSPITAL ENCOUNTER (INPATIENT)
Facility: HOSPITAL | Age: 74
LOS: 3 days | Discharge: HOME | DRG: 321 | End: 2024-05-06
Attending: EMERGENCY MEDICINE | Admitting: HOSPITALIST
Payer: MEDICARE

## 2024-05-03 DIAGNOSIS — I21.3 ST ELEVATION MYOCARDIAL INFARCTION (STEMI), UNSPECIFIED ARTERY (HCC): Primary | ICD-10-CM

## 2024-05-03 LAB
ANION GAP SERPL CALC-SCNC: 11 MMOL/L (ref 0–18)
APTT PPP: 34 SECONDS (ref 23.3–35.6)
BASOPHILS # BLD AUTO: 0.06 X10(3) UL (ref 0–0.2)
BASOPHILS NFR BLD AUTO: 0.8 %
BUN BLD-MCNC: 11 MG/DL (ref 9–23)
BUN/CREAT SERPL: 9.9 (ref 10–20)
CALCIUM BLD-MCNC: 9.6 MG/DL (ref 8.7–10.4)
CHLORIDE SERPL-SCNC: 107 MMOL/L (ref 98–112)
CHOLEST SERPL-MCNC: 251 MG/DL (ref ?–200)
CO2 SERPL-SCNC: 22 MMOL/L (ref 21–32)
CREAT BLD-MCNC: 1.11 MG/DL
DEPRECATED RDW RBC AUTO: 37.7 FL (ref 35.1–46.3)
EGFRCR SERPLBLD CKD-EPI 2021: 71 ML/MIN/1.73M2 (ref 60–?)
EOSINOPHIL # BLD AUTO: 0.02 X10(3) UL (ref 0–0.7)
EOSINOPHIL NFR BLD AUTO: 0.3 %
ERYTHROCYTE [DISTWIDTH] IN BLOOD BY AUTOMATED COUNT: 12 % (ref 11–15)
GLUCOSE BLD-MCNC: 122 MG/DL (ref 70–99)
HCT VFR BLD AUTO: 42.9 %
HCT VFR BLD AUTO: 48.6 %
HDLC SERPL-MCNC: 73 MG/DL (ref 40–59)
HGB BLD-MCNC: 15 G/DL
HGB BLD-MCNC: 17 G/DL
IMM GRANULOCYTES # BLD AUTO: 0.02 X10(3) UL (ref 0–1)
IMM GRANULOCYTES NFR BLD: 0.3 %
ISTAT ACTIVATED CLOTTING TIME: 217 SECONDS (ref 125–137)
ISTAT ACTIVATED CLOTTING TIME: 309 SECONDS (ref 125–137)
LDLC SERPL CALC-MCNC: 149 MG/DL (ref ?–100)
LYMPHOCYTES # BLD AUTO: 1.74 X10(3) UL (ref 1–4)
LYMPHOCYTES NFR BLD AUTO: 22.1 %
MCH RBC QN AUTO: 30.2 PG (ref 26–34)
MCHC RBC AUTO-ENTMCNC: 35 G/DL (ref 31–37)
MCV RBC AUTO: 86.3 FL
MONOCYTES # BLD AUTO: 0.68 X10(3) UL (ref 0.1–1)
MONOCYTES NFR BLD AUTO: 8.7 %
NEUTROPHILS # BLD AUTO: 5.34 X10 (3) UL (ref 1.5–7.7)
NEUTROPHILS # BLD AUTO: 5.34 X10(3) UL (ref 1.5–7.7)
NEUTROPHILS NFR BLD AUTO: 67.8 %
NONHDLC SERPL-MCNC: 178 MG/DL (ref ?–130)
OSMOLALITY SERPL CALC.SUM OF ELEC: 291 MOSM/KG (ref 275–295)
PLATELET # BLD AUTO: 242 10(3)UL (ref 150–450)
POTASSIUM SERPL-SCNC: 4.2 MMOL/L (ref 3.5–5.1)
RBC # BLD AUTO: 5.63 X10(6)UL
SODIUM SERPL-SCNC: 140 MMOL/L (ref 136–145)
TRIGL SERPL-MCNC: 162 MG/DL (ref 30–149)
TROPONIN I SERPL HS-MCNC: 349 NG/L
VLDLC SERPL CALC-MCNC: 31 MG/DL (ref 0–30)
WBC # BLD AUTO: 7.9 X10(3) UL (ref 4–11)

## 2024-05-03 PROCEDURE — B41FYZZ FLUOROSCOPY OF RIGHT LOWER EXTREMITY ARTERIES USING OTHER CONTRAST: ICD-10-PCS | Performed by: INTERNAL MEDICINE

## 2024-05-03 PROCEDURE — B240ZZ3 ULTRASONOGRAPHY OF SINGLE CORONARY ARTERY, INTRAVASCULAR: ICD-10-PCS | Performed by: INTERNAL MEDICINE

## 2024-05-03 PROCEDURE — 4A023N7 MEASUREMENT OF CARDIAC SAMPLING AND PRESSURE, LEFT HEART, PERCUTANEOUS APPROACH: ICD-10-PCS | Performed by: INTERNAL MEDICINE

## 2024-05-03 PROCEDURE — 99223 1ST HOSP IP/OBS HIGH 75: CPT | Performed by: HOSPITALIST

## 2024-05-03 PROCEDURE — 5A2204Z RESTORATION OF CARDIAC RHYTHM, SINGLE: ICD-10-PCS | Performed by: INTERNAL MEDICINE

## 2024-05-03 PROCEDURE — B211YZZ FLUOROSCOPY OF MULTIPLE CORONARY ARTERIES USING OTHER CONTRAST: ICD-10-PCS | Performed by: INTERNAL MEDICINE

## 2024-05-03 PROCEDURE — 71045 X-RAY EXAM CHEST 1 VIEW: CPT | Performed by: EMERGENCY MEDICINE

## 2024-05-03 PROCEDURE — B215YZZ FLUOROSCOPY OF LEFT HEART USING OTHER CONTRAST: ICD-10-PCS | Performed by: INTERNAL MEDICINE

## 2024-05-03 PROCEDURE — 027036Z DILATION OF CORONARY ARTERY, ONE ARTERY WITH THREE DRUG-ELUTING INTRALUMINAL DEVICES, PERCUTANEOUS APPROACH: ICD-10-PCS | Performed by: INTERNAL MEDICINE

## 2024-05-03 PROCEDURE — 02C03ZZ EXTIRPATION OF MATTER FROM CORONARY ARTERY, ONE ARTERY, PERCUTANEOUS APPROACH: ICD-10-PCS | Performed by: INTERNAL MEDICINE

## 2024-05-03 RX ORDER — LIDOCAINE HYDROCHLORIDE 20 MG/ML
INJECTION, SOLUTION EPIDURAL; INFILTRATION; INTRACAUDAL; PERINEURAL
Status: COMPLETED
Start: 2024-05-03 | End: 2024-05-03

## 2024-05-03 RX ORDER — METOCLOPRAMIDE HYDROCHLORIDE 5 MG/ML
10 INJECTION INTRAMUSCULAR; INTRAVENOUS EVERY 8 HOURS PRN
Status: DISCONTINUED | OUTPATIENT
Start: 2024-05-03 | End: 2024-05-06

## 2024-05-03 RX ORDER — HEPARIN SODIUM AND DEXTROSE 10000; 5 [USP'U]/100ML; G/100ML
INJECTION INTRAVENOUS CONTINUOUS
Status: DISCONTINUED | OUTPATIENT
Start: 2024-05-03 | End: 2024-05-03

## 2024-05-03 RX ORDER — ASPIRIN 81 MG/1
81 TABLET ORAL DAILY
Status: DISCONTINUED | OUTPATIENT
Start: 2024-05-04 | End: 2024-05-06

## 2024-05-03 RX ORDER — HEPARIN SODIUM 1000 [USP'U]/ML
5000 INJECTION, SOLUTION INTRAVENOUS; SUBCUTANEOUS ONCE
Status: COMPLETED | OUTPATIENT
Start: 2024-05-03 | End: 2024-05-03

## 2024-05-03 RX ORDER — NITROGLYCERIN 0.4 MG/1
0.4 TABLET SUBLINGUAL ONCE
Status: COMPLETED | OUTPATIENT
Start: 2024-05-03 | End: 2024-05-03

## 2024-05-03 RX ORDER — TEMAZEPAM 15 MG/1
15 CAPSULE ORAL NIGHTLY PRN
Status: DISCONTINUED | OUTPATIENT
Start: 2024-05-03 | End: 2024-05-06

## 2024-05-03 RX ORDER — ACETAMINOPHEN 500 MG
500 TABLET ORAL EVERY 4 HOURS PRN
Status: DISCONTINUED | OUTPATIENT
Start: 2024-05-03 | End: 2024-05-06

## 2024-05-03 RX ORDER — DIPHENHYDRAMINE HYDROCHLORIDE 50 MG/ML
INJECTION INTRAMUSCULAR; INTRAVENOUS
Status: COMPLETED
Start: 2024-05-03 | End: 2024-05-03

## 2024-05-03 RX ORDER — SODIUM CHLORIDE 9 MG/ML
INJECTION, SOLUTION INTRAVENOUS CONTINUOUS
Status: DISCONTINUED | OUTPATIENT
Start: 2024-05-03 | End: 2024-05-04

## 2024-05-03 RX ORDER — HEPARIN SODIUM AND DEXTROSE 10000; 5 [USP'U]/100ML; G/100ML
12 INJECTION INTRAVENOUS ONCE
Status: DISCONTINUED | OUTPATIENT
Start: 2024-05-03 | End: 2024-05-03

## 2024-05-03 RX ORDER — MIDAZOLAM HYDROCHLORIDE 1 MG/ML
INJECTION INTRAMUSCULAR; INTRAVENOUS
Status: COMPLETED
Start: 2024-05-03 | End: 2024-05-03

## 2024-05-03 RX ORDER — WATER 10 ML/10ML
INJECTION INTRAMUSCULAR; INTRAVENOUS; SUBCUTANEOUS
Status: COMPLETED
Start: 2024-05-03 | End: 2024-05-03

## 2024-05-03 RX ORDER — AMIODARONE HYDROCHLORIDE 150 MG/3ML
INJECTION, SOLUTION INTRAVENOUS
Status: COMPLETED
Start: 2024-05-03 | End: 2024-05-03

## 2024-05-03 RX ORDER — DOPAMINE HYDROCHLORIDE 320 MG/100ML
INJECTION, SOLUTION INTRAVENOUS CONTINUOUS
Status: DISCONTINUED | OUTPATIENT
Start: 2024-05-03 | End: 2024-05-04

## 2024-05-03 RX ORDER — NITROGLYCERIN 20 MG/100ML
INJECTION INTRAVENOUS
Status: COMPLETED
Start: 2024-05-03 | End: 2024-05-03

## 2024-05-03 RX ORDER — ONDANSETRON 2 MG/ML
4 INJECTION INTRAMUSCULAR; INTRAVENOUS EVERY 6 HOURS PRN
Status: DISCONTINUED | OUTPATIENT
Start: 2024-05-03 | End: 2024-05-06

## 2024-05-03 RX ORDER — NITROGLYCERIN 20 MG/100ML
INJECTION INTRAVENOUS CONTINUOUS
Status: DISCONTINUED | OUTPATIENT
Start: 2024-05-03 | End: 2024-05-03

## 2024-05-03 RX ORDER — ASPIRIN 81 MG/1
324 TABLET, CHEWABLE ORAL ONCE
Status: COMPLETED | OUTPATIENT
Start: 2024-05-03 | End: 2024-05-03

## 2024-05-03 RX ORDER — HEPARIN SODIUM 1000 [USP'U]/ML
INJECTION, SOLUTION INTRAVENOUS; SUBCUTANEOUS
Status: COMPLETED
Start: 2024-05-03 | End: 2024-05-03

## 2024-05-03 NOTE — ED PROVIDER NOTES
Patient Seen in: [facility] Emergency Department    History     Chief Complaint   Patient presents with    Chest Pain       HPI    History is provided by patient/independent historian: patient  74 year old male with with history of hypertension, here with complaints of intermittent chest pain for the past 3 days.  The pain was continuous for the past hour, feeling it from axilla to axilla and up his neck, so he decided to come in for further evaluation.  No associated shortness of breath.  He has some mild nausea.  He has never had a heart attack.  No numbness or tingling or weakness of the extremities.    History reviewed.   Past Medical History:    Basal cell carcinoma    skin of right nasal sidewall    Colon adenomas    x4    Retinal hemorrhage    left eye    Visual impairment    glasses         History reviewed.   Past Surgical History:   Procedure Laterality Date    Cataract Left     Colonoscopy N/A 06/13/2018    Procedure: COLONOSCOPY;  Surgeon: Gwendolyn Rodriguez MD;  Location: [facility] ENDOSCOPY    Colonoscopy N/A 03/14/2022    Procedure: COLONOSCOPY;  Surgeon: Isiah Houston MD;  Location: [facility] ENDOSCOPY    Exc skin malig 2.1-3cm face,facial Right 2017    w/ skin graft    Inguinal hernia repair Right 1978    Laser surgery of eye Right     Repr cmpl wnd lid,nos,ear 2.5-7.5      Retinal laser procedure Left 06/10/2021    repair retinal detachment         Home Medications reviewed :  Medications Prior to Admission   Medication Sig Dispense Refill Last Dose    Ascorbic Acid (VITAMIN C OR) Take by mouth.       Ergocalciferol (VITAMIN D OR) Take by mouth.       ZINC OR Use as directed in the mouth or throat.       Omega-3 Fatty Acids (FISH OIL OR) Take by mouth.            History reviewed.   Social History     Socioeconomic History    Marital status: Single    Number of children: 0   Occupational History    Occupation: outside cable tech     Comment: retired   Tobacco Use    Smoking status: Never     Smokeless tobacco: Never   Vaping Use    Vaping status: Never Used   Substance and Sexual Activity    Alcohol use: Yes     Alcohol/week: 4.0 standard drinks of alcohol     Types: 4 Cans of beer per week     Comment: 4 beers per day    Drug use: No   Other Topics Concern    Pt has a pacemaker No    Pt has a defibrillator No    Reaction to local anesthetic No         ROS  Review of Systems   Respiratory:  Negative for shortness of breath.    Cardiovascular:  Positive for chest pain.   All other systems reviewed and are negative.     All other pertinent organ systems are reviewed and are negative.      Physical Exam     ED Triage Vitals   BP 05/03/24 1740 (!) 183/114   Pulse 05/03/24 1740 106   Resp 05/03/24 1740 (!) 38   Temp 05/03/24 2015 98.3 °F (36.8 °C)   Temp src 05/03/24 2015 Temporal   SpO2 05/03/24 1740 97 %   O2 Device 05/03/24 1740 None (Room air)     Vital signs reviewed.      Physical Exam  Vitals and nursing note reviewed.   Constitutional:       Comments: Appears uncomfortable   Cardiovascular:      Pulses: Normal pulses.   Pulmonary:      Effort: No respiratory distress.   Chest:      Chest wall: No tenderness.   Abdominal:      General: There is no distension.   Neurological:      Mental Status: He is alert.         ED Course       Labs:     Labs Reviewed   BASIC METABOLIC PANEL (8) - Abnormal; Notable for the following components:       Result Value    Glucose 122 (*)     BUN/CREA Ratio 9.9 (*)     All other components within normal limits   TROPONIN I HIGH SENSITIVITY - Abnormal; Notable for the following components:    Troponin I (High Sensitivity) 349 (*)     All other components within normal limits   LIPID PANEL - Abnormal; Notable for the following components:    Cholesterol, Total 251 (*)     HDL Cholesterol 73 (*)     Triglycerides 162 (*)     LDL Cholesterol 149 (*)     VLDL 31 (*)     Non HDL Chol 178 (*)     All other components within normal limits   POCT ISTAT ACTIVATED CLOTTING TIME -  Abnormal; Notable for the following components:    ISTAT Activated Clotting Time 217 (*)     All other components within normal limits   POCT ISTAT ACTIVATED CLOTTING TIME - Abnormal; Notable for the following components:    ISTAT Activated Clotting Time 309 (*)     All other components within normal limits   PTT, ACTIVATED - Normal   CBC WITH DIFFERENTIAL WITH PLATELET    Narrative:     The following orders were created for panel order CBC With Differential With Platelet.                  Procedure                               Abnormality         Status                                     ---------                               -----------         ------                                     CBC W/ DIFFERENTIAL[[id number]]                              Final result                                                 Please view results for these tests on the individual orders.   RAINBOW DRAW LAVENDER   RAINBOW DRAW LIGHT GREEN   RAINBOW DRAW BLUE   CBC W/ DIFFERENTIAL         My EKG Interpretation: EKG    Rate, intervals and axes as noted on EKG Report.  Rate: 101  Rhythm: Sinus Rhythm  Reading: abnormal for rate, normal for rhythm, ST elevation anterolaterlally           As reviewed and Interpreted by me      Imaging Results Available and Reviewed while in ED:   XR CHEST AP PORTABLE  (CPT=71045)    Result Date: 5/3/2024  CONCLUSION:  No radiographic evidence of acute cardiopulmonary process.    Dictated by (CST): Abby Muñoz MD on 5/03/2024 at 6:31 PM     Finalized by (CST): Abby Muñoz MD on 5/03/2024 at 6:32 PM         My review and independent interpretation of XR images: no infiltrate. Radiology report corroborates this in addition to other details as reported by them.      Decision rules/scores evaluated: none      Diagnostic labs/tests considered but not ordered: none    ED Medications Administered:   Medications   nitroGLYCERIN in dextrose 5% 50 mg/250mL infusion premix (10 mcg/min Intravenous Handoff  5/3/24 1840)   heparin (Porcine) 51433 units/250mL infusion ED INITIAL DOSE (12 Units/kg/hr × 86.2 kg Intravenous Handoff 5/3/24 1839)   heparin (Porcine) 32279 units/250mL infusion ACS/AFIB CONTINUOUS (has no administration in time range)   acetaminophen (Tylenol Extra Strength) tab 500 mg (has no administration in time range)   ondansetron (Zofran) 4 MG/2ML injection 4 mg (has no administration in time range)   metoclopramide (Reglan) 5 mg/mL injection 10 mg (has no administration in time range)   temazepam (Restoril) cap 15 mg (has no administration in time range)   metoprolol tartrate (Lopressor) tab 25 mg (has no administration in time range)   amiodarone (Cordarone) 450 mg in dextrose 5% 250 mL infusion (has no administration in time range)     Followed by   amiodarone (Cordarone) 450 mg in dextrose 5% 250 mL infusion (has no administration in time range)   sodium chloride 0.9% infusion (has no administration in time range)   ticagrelor (Brilinta) tab 90 mg (has no administration in time range)   aspirin DR tab 81 mg (has no administration in time range)   aspirin chewable tab 324 mg (324 mg Oral Given 5/3/24 1746)   heparin (Porcine) 1000 UNIT/ML injection - BOLUS IV 5,000 Units (5,000 Units Intravenous Given 5/3/24 1747)   nitroglycerin (Nitrostat) SL tab 0.4 mg (0.4 mg Sublingual Given 5/3/24 1746)   lidocaine PF (Xylocaine-MPF) 2 % injection (has no administration in time range)   heparin in sodium chloride 0.9% (Porcine) 2 Units/mL flush bag premix (has no administration in time range)   heparin (Porcine) 1000 UNIT/ML injection (has no administration in time range)   midazolam (Versed) 2 MG/2ML injection (has no administration in time range)   fentaNYL (Sublimaze) 50 mcg/mL injection (has no administration in time range)   lidocaine PF (Xylocaine-MPF) 2 % injection (has no administration in time range)   heparin in sodium chloride 0.9% (Porcine) 2 Units/mL flush bag premix (has no administration in time  range)   heparin in sodium chloride 0.9% (Porcine) 2 Units/mL flush bag premix (has no administration in time range)   diphenhydrAMINE (Benadryl) 50 mg/mL  injection (has no administration in time range)   amiodarone (Cordarone) 150 MG/3ML injection (has no administration in time range)   midazolam (Versed) 2 MG/2ML injection (has no administration in time range)   cangrelor (Kengreal) 50 MG injection (has no administration in time range)   sterile water for injection (PF) injection (has no administration in time range)   Nitroglycerin in D5W 200-5 MCG/ML-% injection (has no administration in time range)   ticagrelor (Brilinta) 90 MG tab (has no administration in time range)   fentaNYL (Sublimaze) 50 mcg/mL injection (has no administration in time range)   iopamidol (ISOVUE-300) 61 % injection 250 mL (250 mL Injection Given 5/3/24 2005)                MDM       Medical Decision Making      Differential Diagnosis: After obtaining the patient's history, performing the physical exam and reviewing the diagnostics, multiple initial diagnoses were considered based on the presenting problem including STEMI, dissection, PE, angina    External document review: I personally reviewed available external medical records for any recent pertinent discharge summaries, testing, and procedures - the findings are as follows: 9/26/23 visit with Dr. Tony for medicare subsequent annual wellness visit    Complicating Factors: The patient already  has a past medical history of Basal cell carcinoma (09/2017), Colon adenomas (03/14/2022), Retinal hemorrhage, and Visual impairment. to contribute to the complexity of this ED evaluation.    Procedures performed: none    Discussed management with physician/appropriate source: Dr. Alva, Dr. Zaidi, Jo APRN for [facility]    Considered admission/deescalation of care for: STEMI    Social determinants of health affecting patient care: none    Prescription medications considered: discussed continuing  current medication regimen    The patient requires continuous monitoring for: chest pain    Shared decision making: discussed possible admission    Critical Care Time:  Total critical care time for this patient was 30 minutes exclusive of separately billable procedures, but in obtaining history, performing a physical exam, bedside monitoring of interventions, collecting and interpreting test, and discussion with consultants.        Disposition and Plan     Clinical Impression:  1. ST elevation myocardial infarction (STEMI), unspecified artery (HCC)        Disposition:  Send to or/cath/gi    Follow-up:  No follow-up provider specified.    Medications Prescribed:  Current Discharge Medication List

## 2024-05-03 NOTE — ED QUICK NOTES
Orders for admission, patient is aware of plan and ready to go upstairs. Any questions, please call ED RN [phone] at extension Mary Anne.     Patient Covid vaccination status: Unvaccinated     COVID Test Ordered in ED: None    COVID Suspicion at Admission: N/A    Running Infusions:    nitroGLYCERIN in dextrose 5% 5 mcg/min (05/03/24 7326)    continuous dose heparin          Mental Status/LOC at time of transport: ALERT    Other pertinent information: on room air,20G IV L AC, 18G IV R AC    CIWA score: N/A   NIH score:  N/A

## 2024-05-03 NOTE — CONSULTS
[facility] - CARDIOLOGY CONSULT NOTE    Harry Parra Patient Status:  Emergency    1950 MRN [id number]   Location [facility] EMERGENCY DEPARTMENT Attending Kwasi Balderas MD   Hosp Day # 0 PCP Vargas Tony MD     Date of Admission:  5/3/2024  Date of Consult:  5/3/2024  I was asked by Kwasi Balderas MD to provide recommendations for evaluation and management of cardiac issues.  Impression:     74-year-old male with anterior ST elevations, chest pain.  History of hypertension.    Recommendations:  1.  ST elevation MI  Anterior elevation suspect LAD stenosis.  Continue heparin and nitro drips.  Proceed with cardiac catheterization risks and benefits explained patient would like to proceed.  2D echocardiogram with Doppler  Aspirin 325 mg.  2.  Hypertension  Does not take medications at home.  Start metoprolol tartrate 25 mg twice a day  3.  Hyperlipidemia  Check lipid panel        L5 consult, will follow      Reason for Consultation:     Chest pain    History of Present Illness:   Patient is a 74 year old male who was admitted to the hospital for chest pain.  Reports this started at 4:30 PM today.  Crushing pain 10 out of 10 in severity no radiation.  No associated shortness of breath or syncope.  No prior pain like this in the past.  Waited for about an hour and then decided to get in the car and drive to the hospital.  Upon presentation was still having chest pain 8 out of 10.  EKG showed anterior ST elevations cardiac alert was called in.  Prior history of hypertension and hyperlipidemia no diabetes.      Cardiac tests:    Past Medical History  Past Medical History:    Basal cell carcinoma    skin of right nasal sidewall    Colon adenomas    x4    Retinal hemorrhage    left eye    Visual impairment    glasses       Past Surgical History  Past Surgical History:   Procedure Laterality Date    Cataract Left     Colonoscopy N/A 2018    Procedure: COLONOSCOPY;  Surgeon: Gwendolyn Rodriguez  MD Naman;  Location: [facility] ENDOSCOPY    Colonoscopy N/A 03/14/2022    Procedure: COLONOSCOPY;  Surgeon: Isiah Houston MD;  Location: [facility] ENDOSCOPY    Exc skin malig 2.1-3cm face,facial Right 2017    w/ skin graft    Inguinal hernia repair Right 1978    Laser surgery of eye Right     Repr cmpl wnd lid,nos,ear 2.5-7.5      Retinal laser procedure Left 06/10/2021    repair retinal detachment       Family History  Family History   Problem Relation Age of Onset    Cancer Father         Colon    Skin cancer Father     Cancer Mother         breast    Cancer Daughter     Cancer Maternal Grandmother     Cancer Other        Social History  Pediatric History   Patient Parents    Not on file     Other Topics Concern    [place] up [place] a farm Not Asked    History of tanning Not Asked    Outdoor occupation Not Asked    Pt has a pacemaker No    Pt has a defibrillator No    Reaction to local anesthetic No   Social History Narrative    Not on file         Denies smoking.  Occasional alcohol use.  Current Medications:  Current Facility-Administered Medications   Medication Dose Route Frequency    nitroGLYCERIN in dextrose 5% 50 mg/250mL infusion premix  5-400 mcg/min Intravenous Continuous    heparin (Porcine) 98342 units/250mL infusion ED INITIAL DOSE  12 Units/kg/hr Intravenous Once    heparin (Porcine) 81958 units/250mL infusion ACS/AFIB CONTINUOUS  200-3,000 Units/hr Intravenous Continuous     (Not in a hospital admission)      Allergies  No Known Allergies    Review of Systems:   10 pt ROS performed, separate from HPI  Review of Systems:  GENERAL: no fevers, chills, sweats  HEENT: no visual or hearing changes  SKIN: denies any unusual skin lesions or rashes  RESPIRATORY: denies shortness of breath with exertion  CARDIOVASCULAR: active chest pain, no claudication  GI: denies abdominal pain and denies heartburn  : no dysuria or hematuria  NEURO: denies headaches, focal weaknesses or paresthesias  All other systems reviewed and  negative.  fatigue is present  All other systems were reviewed are negative  Physical Exam:   Blood pressure (!) 147/98, pulse 98, resp. rate 15, weight 190 lb (86.2 kg), SpO2 94%.    Scheduled Meds:    ED initial dose heparin  12 Units/kg/hr Intravenous Once         Physical Exam:    General: Alert and oriented. elderly  male moderate distress.    HEENT: Normocephalic, anicteric sclera, neck supple  Neck: No JVD, carotids 2+, supple  Cardiac: Regular rate. No pathologic murmur.  Lungs: Clear with normal effort.  Normal excursions and effort.  Abdomen: Soft, non-tender. BS-present.  Extremities: Without clubbing, cyanosis.  Peripheral pulsespresent.  Neurologic: Alert and oriented, normal affect. Motor ok.  Skin: Warm and dry.     Results:     Laboratory Data:  Lab Results   Component Value Date    WBC 7.9 05/03/2024    HGB 17.0 05/03/2024    HCT 48.6 05/03/2024    .0 05/03/2024    CREATSERUM 1.10 09/26/2023    BUN 14 09/26/2023     09/26/2023    K 3.9 09/26/2023     09/26/2023    CO2 23.0 09/26/2023    GLU 95 09/26/2023    CA 9.1 09/26/2023    ALB 3.8 09/26/2023    ALKPHO 65 09/26/2023    TP 7.6 09/26/2023    AST 23 09/26/2023    ALT 34 09/26/2023         Thank you for allowing me to participate in the care of your patient.    Aubrey Zaidi MD  [facility]  Interventional Cardiology  5/3/2024

## 2024-05-04 ENCOUNTER — APPOINTMENT (OUTPATIENT)
Dept: CV DIAGNOSTICS | Facility: HOSPITAL | Age: 74
DRG: 321 | End: 2024-05-04
Attending: INTERNAL MEDICINE
Payer: MEDICARE

## 2024-05-04 LAB
ANION GAP SERPL CALC-SCNC: 10 MMOL/L (ref 0–18)
APTT PPP: 31.5 SECONDS (ref 23.3–35.6)
ATRIAL RATE: 101 BPM
ATRIAL RATE: 109 BPM
BASOPHILS # BLD AUTO: 0.04 X10(3) UL (ref 0–0.2)
BASOPHILS NFR BLD AUTO: 0.3 %
BUN BLD-MCNC: 11 MG/DL (ref 9–23)
BUN/CREAT SERPL: 10.9 (ref 10–20)
CALCIUM BLD-MCNC: 8.6 MG/DL (ref 8.7–10.4)
CHLORIDE SERPL-SCNC: 107 MMOL/L (ref 98–112)
CO2 SERPL-SCNC: 20 MMOL/L (ref 21–32)
CREAT BLD-MCNC: 1.01 MG/DL
DEPRECATED RDW RBC AUTO: 38.7 FL (ref 35.1–46.3)
EGFRCR SERPLBLD CKD-EPI 2021: 79 ML/MIN/1.73M2 (ref 60–?)
EOSINOPHIL # BLD AUTO: 0 X10(3) UL (ref 0–0.7)
EOSINOPHIL NFR BLD AUTO: 0 %
ERYTHROCYTE [DISTWIDTH] IN BLOOD BY AUTOMATED COUNT: 12.2 % (ref 11–15)
EST. AVERAGE GLUCOSE BLD GHB EST-MCNC: 108 MG/DL (ref 68–126)
GLUCOSE BLD-MCNC: 134 MG/DL (ref 70–99)
HBA1C MFR BLD: 5.4 % (ref ?–5.7)
HCT VFR BLD AUTO: 43 %
HGB BLD-MCNC: 14.8 G/DL
IMM GRANULOCYTES # BLD AUTO: 0.05 X10(3) UL (ref 0–1)
IMM GRANULOCYTES NFR BLD: 0.4 %
LYMPHOCYTES # BLD AUTO: 1.14 X10(3) UL (ref 1–4)
LYMPHOCYTES NFR BLD AUTO: 9.9 %
MCH RBC QN AUTO: 29.7 PG (ref 26–34)
MCHC RBC AUTO-ENTMCNC: 34.4 G/DL (ref 31–37)
MCV RBC AUTO: 86.3 FL
MONOCYTES # BLD AUTO: 0.72 X10(3) UL (ref 0.1–1)
MONOCYTES NFR BLD AUTO: 6.3 %
NEUTROPHILS # BLD AUTO: 9.55 X10 (3) UL (ref 1.5–7.7)
NEUTROPHILS # BLD AUTO: 9.55 X10(3) UL (ref 1.5–7.7)
NEUTROPHILS NFR BLD AUTO: 83.1 %
OSMOLALITY SERPL CALC.SUM OF ELEC: 285 MOSM/KG (ref 275–295)
P AXIS: 69 DEGREES
P AXIS: 70 DEGREES
P-R INTERVAL: 150 MS
P-R INTERVAL: 152 MS
PLATELET # BLD AUTO: 244 10(3)UL (ref 150–450)
POTASSIUM SERPL-SCNC: 4.2 MMOL/L (ref 3.5–5.1)
Q-T INTERVAL: 362 MS
Q-T INTERVAL: 362 MS
QRS DURATION: 84 MS
QRS DURATION: 86 MS
QTC CALCULATION (BEZET): 469 MS
QTC CALCULATION (BEZET): 487 MS
R AXIS: -11 DEGREES
R AXIS: -31 DEGREES
RBC # BLD AUTO: 4.98 X10(6)UL
SODIUM SERPL-SCNC: 137 MMOL/L (ref 136–145)
T AXIS: 51 DEGREES
T AXIS: 52 DEGREES
VENTRICULAR RATE: 101 BPM
VENTRICULAR RATE: 109 BPM
WBC # BLD AUTO: 11.5 X10(3) UL (ref 4–11)

## 2024-05-04 PROCEDURE — 99233 SBSQ HOSP IP/OBS HIGH 50: CPT | Performed by: HOSPITALIST

## 2024-05-04 PROCEDURE — 93306 TTE W/DOPPLER COMPLETE: CPT | Performed by: INTERNAL MEDICINE

## 2024-05-04 RX ORDER — ATORVASTATIN CALCIUM 40 MG/1
40 TABLET, FILM COATED ORAL NIGHTLY
Status: DISCONTINUED | OUTPATIENT
Start: 2024-05-04 | End: 2024-05-06

## 2024-05-04 RX ORDER — PANTOPRAZOLE SODIUM 40 MG/1
40 TABLET, DELAYED RELEASE ORAL
Status: DISCONTINUED | OUTPATIENT
Start: 2024-05-04 | End: 2024-05-06

## 2024-05-04 NOTE — H&P
[facility]    PATIENT'S NAME: J CARLOS SANCHEZ   ATTENDING PHYSICIAN: Dixon Martínez MD   PATIENT ACCOUNT#:   [id number]    LOCATION:  [place] [address]  MEDICAL RECORD #:   [id number]       YOB: 1950  ADMISSION DATE:       05/03/2024    HISTORY AND PHYSICAL EXAMINATION    CHIEF COMPLAINT:  ST-elevation myocardial infarction.    HISTORY OF PRESENT ILLNESS:  Patient is a 74-year-old  male who came into the emergency department with midsternal chest pain radiating to bilateral shoulders on and off since this morning and noted to have ST elevation in the anterolateral leads.  CBC was unremarkable.  Troponin still pending.  Started on IV heparin and nitroglycerin drip and aspirin, and he will be taken to the catheterization lab by Cardiology for cardiac angiogram.    PAST MEDICAL HISTORY:  Patient denies any medical problems.     PAST SURGICAL HISTORY:  Cataract procedure, left retinal detachment procedure, right inguinal hernia repair, basal cell carcinoma resection.    MEDICATIONS:  Please see medication reconciliation list.     ALLERGIES:  No known drug allergies.     FAMILY HISTORY:  Mother had breast cancer.  Father had colon cancer.  No family history of premature coronary artery disease.      SOCIAL HISTORY:  He drinks 3 to 4 beers daily.  No alcohol or drug use.  Lives with his family.  Independent in his basic activities of daily living.     REVIEW OF SYSTEMS:  Patient said for the last several weeks he has been noticing tightness in his anterior neck and upper chest with physical activity.  It goes away with rest.  Since this morning, he has been having on-and-off crushing pain, midsternal, radiating to both armpits and neck base associated with some diaphoresis and shortness of breath.  Other 12-point review of systems is negative.       PHYSICAL EXAMINATION:    GENERAL:  Alert and oriented to time, place and person.  Moderate distress.  Very anxious.  VITAL SIGNS:  Temperature  198, pulse 106, respiratory rate 38, blood pressure 180/114, pulse ox 97% on room air.   HEENT:  Atraumatic.  Oropharynx clear.  Dry mucous membranes.  Ears and nose normal.  Eyes:  Anicteric sclerae.    NECK:  Supple.  No lymphadenopathy.  Trachea midline.  Full range of motion.   LUNGS:  Clear to auscultation bilaterally.  Normal respiratory effort.    HEART:  Regular rate and rhythm.  S1 and S2 auscultated.  No murmur.    ABDOMEN:  Soft, nondistended.  No tenderness.  Positive bowel sounds.   EXTREMITIES:  No peripheral edema, clubbing or cyanosis.   NEUROLOGIC:  Motor and sensory intact.      ASSESSMENT:    1.   ST-elevation myocardial infarction.   2.   Hypertension.    PLAN:  Patient will be taken to the catheterization lab urgently by Cardiology for cardiac angiogram.  Started on IV heparin and nitroglycerin and aspirin in the emergency room.  Further recommendations pending cardiac angiogram results.     Dictated By Kayy Alva MD  d: 05/03/2024 18:03:18  t: 05/03/2024 19:20:35  Job [id number]  /

## 2024-05-04 NOTE — PLAN OF CARE
Pt. Came up from cath lab status post stent placement d/t stemi. Pt. Is alert 4 with c/o of 2/10 chest pain. Friend/family at the bedside. R groin site soft, non swelling, c/d/I.  Pt. Briefly went harjinder and hypotensive. Cards messaged for orders. H&H normal. Pt. Has since improved with all needs met. Will continue to follow the plan of care.  Problem: Patient Centered Care  Goal: Patient preferences are identified and integrated in the patient's plan of care  Description: Interventions:  - What would you like us to know as we care for you?   - Provide timely, complete, and accurate information to patient/family  - Incorporate patient and family knowledge, values, beliefs, and cultural backgrounds into the planning and delivery of care  - Encourage patient/family to participate in care and decision-making at the level they choose  - Honor patient and family perspectives and choices  5/4/2024 0457 by Talisha Chase RN  Outcome: Progressing  5/4/2024 0455 by Talisha Chase RN  Outcome: Progressing     Problem: Patient/Family Goals  Goal: Patient/Family Long Term Goal  Description: Patient's Long Term Goal:     Interventions:  -   - See additional Care Plan goals for specific interventions  5/4/2024 0457 by Talisha Chase RN  Outcome: Progressing  5/4/2024 0455 by Talisha Chase RN  Outcome: Progressing  Goal: Patient/Family Short Term Goal  Description: Patient's Short Term Goal:     Interventions:   -   - See additional Care Plan goals for specific interventions  5/4/2024 0457 by Talisha Chase, RN  Outcome: Progressing  5/4/2024 0455 by Talisha Chase RN  Outcome: Progressing     Problem: CARDIOVASCULAR - ADULT  Goal: Maintains optimal cardiac output and hemodynamic stability  Description: INTERVENTIONS:  - Monitor vital signs, rhythm, and trends  - Monitor for bleeding, hypotension and signs of decreased cardiac output  - Evaluate effectiveness of vasoactive medications to optimize hemodynamic stability  - Monitor arterial  and/or venous puncture sites for bleeding and/or hematoma  - Assess quality of pulses, skin color and temperature  - Assess for signs of decreased coronary artery perfusion - ex. Angina  - Evaluate fluid balance, assess for edema, trend weights  Outcome: Progressing  Goal: Absence of cardiac arrhythmias or at baseline  Description: INTERVENTIONS:  - Continuous cardiac monitoring, monitor vital signs, obtain 12 lead EKG if indicated  - Evaluate effectiveness of antiarrhythmic and heart rate control medications as ordered  - Initiate emergency measures for life threatening arrhythmias  - Monitor electrolytes and administer replacement therapy as ordered  Outcome: Progressing     Problem: SKIN/TISSUE INTEGRITY - ADULT  Goal: Skin integrity remains intact  Description: INTERVENTIONS  - Assess and document risk factors for pressure ulcer development  - Assess and document skin integrity  - Monitor for areas of redness and/or skin breakdown  - Initiate interventions, skin care algorithm/standards of care as needed  Outcome: Progressing     Problem: SAFETY ADULT - FALL  Goal: Free from fall injury  Description: INTERVENTIONS:  - Assess pt frequently for physical needs  - Identify cognitive and physical deficits and behaviors that affect risk of falls.  - [place] fall precautions as indicated by assessment.  - Educate pt/family on patient safety including physical limitations  - Instruct pt to call for assistance with activity based on assessment  - Modify environment to reduce risk of injury  - Provide assistive devices as appropriate  - Consider OT/PT consult to assist with strengthening/mobility  - Encourage toileting schedule  Outcome: Progressing

## 2024-05-04 NOTE — PLAN OF CARE
2D echocardiogram completed, medically stable to transfer per cardiology   Problem: CARDIOVASCULAR - ADULT  Goal: Maintains optimal cardiac output and hemodynamic stability  Description: INTERVENTIONS:  - Monitor vital signs, rhythm, and trends  - Monitor for bleeding, hypotension and signs of decreased cardiac output  - Evaluate effectiveness of vasoactive medications to optimize hemodynamic stability  - Monitor arterial and/or venous puncture sites for bleeding and/or hematoma  - Assess quality of pulses, skin color and temperature  - Assess for signs of decreased coronary artery perfusion - ex. Angina  - Evaluate fluid balance, assess for edema, trend weights  Outcome: Progressing  Goal: Absence of cardiac arrhythmias or at baseline  Description: INTERVENTIONS:  - Continuous cardiac monitoring, monitor vital signs, obtain 12 lead EKG if indicated  - Evaluate effectiveness of antiarrhythmic and heart rate control medications as ordered  - Initiate emergency measures for life threatening arrhythmias  - Monitor electrolytes and administer replacement therapy as ordered  Outcome: Progressing     Problem: SKIN/TISSUE INTEGRITY - ADULT  Goal: Skin integrity remains intact  Description: INTERVENTIONS  - Assess and document risk factors for pressure ulcer development  - Assess and document skin integrity  - Monitor for areas of redness and/or skin breakdown  - Initiate interventions, skin care algorithm/standards of care as needed  Outcome: Progressing     Problem: SAFETY ADULT - FALL  Goal: Free from fall injury  Description: INTERVENTIONS:  - Assess pt frequently for physical needs  - Identify cognitive and physical deficits and behaviors that affect risk of falls.  - [place] fall precautions as indicated by assessment.  - Educate pt/family on patient safety including physical limitations  - Instruct pt to call for assistance with activity based on assessment  - Modify environment to reduce risk of injury  - Provide  assistive devices as appropriate  - Consider OT/PT consult to assist with strengthening/mobility  - Encourage toileting schedule  Outcome: Progressing

## 2024-05-04 NOTE — PROCEDURES
[facility]  [facility] Cardiac Cath Procedure Note  Harry Parra Patient Status:  Emergency    1950 MRN [id number]   Location [facility] EMERGENCY DEPARTMENT Attending Kwasi Balderas MD   Hosp Day # 0 PCP Vargas Tony MD       Cardiologist: Aubrey Zaidi MD  Primary Proceduralist: Aubrey Zaidi MD  Procedure Performed: LHC, IVUS LAD, aspiration thrombectomy LAD, stent PCI LAD 3 stents placed.  Date of Procedure: 5/3/2024   Indication: ST elevation MI    Summary of findings: Fresh thrombus acute occlusion of the LAD.      Left Ventriculography and hemodynamics: LVEDP 17 mmHg, LVEF estimated 40% anterior hypokinesis.      Coronary Angiography  RCA:  Dominant and free of obstructive disease, supplies PDA and PL  Left main:  Patent, free of obstructive disease  Left anterior descending: Proximal to mid 100% occluded with fresh thrombus, supplies 2 diagonals first diagonal ostial 90% stenosis  Ramus intermedius: 99% ostium  Circumflex:  Patent and free of obstructive disease, supplies 2 OM branches which are patent      Assessment:  100% LAD stenosis      Recommendations:  Stent PCI      Lesion 1 proximal LAD    Lesion Characteristics-  torturous,  calcified.  Type c lesion.  Pre-intervention stenosis 100%, Post intervention stenosis 0%.  Pre JOHNNIE 0, Post JOHNNIE 3.    Guide-EBU 3.5, 6 Danish  Wire-Choice PT extra-support in the distal LAD  [place] in the first diagonal for protection  Aspiration thrombectomy using Pronto device basket for clot.  Predilated 3.0 x 30 mm balloon, stent PCI using 3.5 x 32 mm Synergy drug-eluting stent  IVUS showed proximal portion of the stent not well opposed postdilated using 4.0 x 20 mm noncompliant balloon high-pressure 18 eunice  It also showed that the proximal LAD has significant stenosis proximal to the stent extending to the ostium of the LAD    Lesion 2 mid to distal LAD    Lesion Characteristics-  torturous, non calcified.  Type non-c lesion.  Pre-intervention  stenosis 80%, Post intervention stenosis 0%.  Pre JOHNNIE 1, Post JOHNNIE 3.    Guide-EBU 3.5, 6 Slovak  Wire-Choice PT extra-support  Stent PCI using 3.0 x 12 mm Synergy drug-eluting stent.  IVUS showed stent well opposed.    Lesion 3 proximal LAD    Lesion Characteristics-  torturous,  calcified.  Type c lesion.  Pre-intervention stenosis 70%, Post intervention stenosis 0%.  Pre JOHNNIE 1, Post JOHNNIE 3.    Guide-EBU 3.5, 6 Slovak  Wire-Choice PT extra-support  Stent PCI using 4.0 x 16 mm Synergy drug-eluting stent.      Patient given cangrelor bolus followed by cangrelor drip.  Developed ventricular fibrillation at the beginning of the procedure 1 shock delivered 200 J synchronized converted to sinus rhythm.  150 mg IV bolus given.    Loaded with 180 mg of Brilinta.              Description of Procedure:   After written informed consent was obtained from the patient, patient was brought to the cardiac catheterization laboratory.  Patient was prepped and draped in the usual sterile fashion. Lidocaine 1% was used to infiltrate the right groin for local anesthesia and a 6 Slovak introducer sheath was inserted into the right femoral artery.      Selective coronary angiography performed with JR4 catheter for RCA and 6 Slovak EBU 3.5 catheter for LCA.  Angiography performed in standard projections.      6 Arabic pigtail placed in LV for LV angiogram in [place]O projection and LV hemodynamics.    Selective right femoral angiogram done assess anatomy for closure.      Specimen sent to: No specimen collected  Estimated blood loss: 10 cc  Closure: Angio-Seal      IV was maintained by RN and moderate conscious sedation of versed and fentanyl was given.  Patient was assessed and monitoring of oxygen, heart rate and blood pressure by nurse and myself during the exam 61 minutes.      Aubrey Zaidi MD  05/03/24

## 2024-05-04 NOTE — PROGRESS NOTES
Patient settled into room post-cath. S/P LHC w/ TONE x3. R groin angioseal. S/p vfib w/ shock x1, no ectopy at this time. NSR 80's w/ stable VS. C/D/I, R pedal +1 pulse, cool RLE with good cap refill. Report taken from cath lab team. Endorsed to Talisha RIBEIRO.

## 2024-05-04 NOTE — DIETARY NOTE
NUTRITION EDUCATION NOTE     Received consult for cardiac nutrition education. Verbally reviewed basic cardiac diet restrictions. Provided with Eating Heart-Healthy and NCM handout to reinforce. Receptive to instruction. Would benefit from outpt f/u. Expect fair to good compliance. RD contact information provided to ptDarcie Gray MS, LATOYA, RDN, LDN  Clinical Dietitian  P: [phone]

## 2024-05-04 NOTE — PROGRESS NOTES
[facility]  Progress Note     Harry Parra  : 1950    Status: Inpatient  Day #: 1    Attending: Balaji Valverde MD  PCP: Vargas Tony MD     Assessment and Plan:    STEMI  -cardiology consulted  -s/p LAD stents x3  -cont asa, brilinta, statin, metoprolol  -f/u echo    HTN  -cont metoprolol and monitor    Nonfasting hyperglycemia  -check a1c     DVT Mechanical Prophylaxis:   SCDs,    DVT Pharmacologic Prophylaxis   Medication   None         DVT Pharmacologic prophylaxis: Aspirin 81 mg     Subjective:      Initial Chief Complaint:  chest pain    Some upper abd pain relieved after eating. No SOB. No n/v.     10 point ROS completed and was negative, except for pertinent positive and negatives stated in subjective.      Objective:      Temp:  [98.3 °F (36.8 °C)] 98.3 °F (36.8 °C)  Pulse:  [] 91  Resp:  [13-38] 23  BP: ()/() 121/76  SpO2:  [93 %-97 %] 97 %  General:  Alert, no distress  HEENT:  Normocephalic, atraumatic  Cardiac:  Regular rate, regular rhythm  Pulmonary:  Clear to auscultation bilaterally, respirations unlabored  Gastrointestinal:  Soft, non-tender, normal bowel sounds  Musculoskeletal:  No joint swelling  Extremities:  No edema, no cyanosis, no clubbing  Neurologic:  nonfocal  Psychiatric:  Normal affect, calm and appropriate    Intake/Output Summary (Last 24 hours) at 2024 1042  Last data filed at 2024 1000  Gross per 24 hour   Intake 990 ml   Output 750 ml   Net 240 ml         Recent Labs   Lab 24  2252 24  0404   WBC 7.9  --  11.5*   HGB 17.0 15.0 14.8   HCT 48.6 42.9 43.0   .0  --  244.0   RBC 5.63  --  4.98   MCV 86.3  --  86.3   MCH 30.2  --  29.7   MCHC 35.0  --  34.4   RDW 12.0  --  12.2   NEPRELIM 5.34  --  9.55*     Recent Labs   Lab 24  0250 24  0404   BUN 11  --  11   CREATSERUM 1.11  --  1.01   CA 9.6  --  8.6*     --  137   K 4.2  --  4.2     --  107   CO2 22.0  --  20.0*    *  --  134*   PTT 34.0 31.5  --        XR CHEST AP PORTABLE  (CPT=71045)    Result Date: 5/3/2024  CONCLUSION:  No radiographic evidence of acute cardiopulmonary process.    Dictated by (CST): Abby Muñoz MD on 5/03/2024 at 6:31 PM     Finalized by (CST): Abby Muñoz MD on 5/03/2024 at 6:32 PM         EKG 12 Lead    Result Date: 5/4/2024  Sinus tachycardia Low voltage QRS, consider pulmonary disease, pericardial effusion, or normal variant Cannot rule out Anterior infarct , age undetermined Abnormal ECG No previous ECGs found in [facility]    EKG 12 Lead    Result Date: 5/4/2024  Sinus tachycardia Left axis deviation Low voltage QRS, consider pulmonary disease, pericardial effusion, or normal variant Cannot rule out Anterior infarct (cited on or before 03-MAY-2024) Abnormal ECG When compared with ECG of 03-MAY-2024 17:34, No significant change was found   Medications:   atorvastatin  40 mg Oral Nightly    metoprolol tartrate  25 mg Oral 2x Daily(Beta Blocker)    ticagrelor  90 mg Oral Q12H    aspirin  81 mg Oral Daily    atropine          PRN Meds:   acetaminophen    ondansetron    metoclopramide    temazepam    atropine    Supplementary Documentation:        [facility]. Time spent on chart/note review, review of labs/imaging, discussion with patient, physical exam, discussion with staff, consultants, coordinating care, writing progress note, discussion of plan of care.      Balaji Valverde MD

## 2024-05-04 NOTE — PROGRESS NOTES
Cardiology Progress Note    Harry Parra Patient Status:  Inpatient    1950 MRN [id number]   Location [facility] 2W/SW Attending Balaji Valverde MD   Hosp Day # 1 PCP Vargas Tony MD       Subjective: Feels better, some lingering pain in the chest.  No pain in the groin.    Objective:   Temp: 98.3 °F (36.8 °C)  Pulse: 88  Resp: 18  BP: 120/74    Intake/Output:     Intake/Output Summary (Last 24 hours) at 2024 0800  Last data filed at 2024 0550  Gross per 24 hour   Intake 690 ml   Output 400 ml   Net 290 ml       Last 3 Weights   24 0536 190 lb 11.2 oz (86.5 kg)   24 1740 190 lb (86.2 kg)   23 1350 192 lb (87.1 kg)   22 0918 188 lb (85.3 kg)       Tele: NSR     Physical Exam:     General: Alert and oriented x 3. No apparent distress. No respiratory or constitutional distress.  HEENT: Normocephalic, anicteric sclera, neck supple.  Neck: No JVD, carotids 2+, no bruits.  Cardiac: Regular rate and rhythm. S1, S2 normal. No murmur, pericardial rub, S3.  Lungs: Clear without wheezes, rales, rhonchi or dullness.  Normal excursions and effort.  Abdomen: Soft, non-tender. BS-present.  Extremities: Without clubbing, cyanosis or edema.  Peripheral pulses are 2+.  Neurologic: Alert and oriented, normal affect.  Skin: Warm and dry.     Laboratory/Data:    Labs:         Recent Labs   Lab 242 24  0404   WBC 7.9  --  11.5*   HGB 17.0 15.0 14.8   MCV 86.3  --  86.3   .0  --  244.0       Recent Labs   Lab 24  0404    137   K 4.2 4.2    107   CO2 22.0 20.0*   BUN 11 11   CREATSERUM 1.11 1.01   CA 9.6 8.6*   * 134*       No results for input(s): \"ALT\", \"AST\", \"ALB\", \"AMYLASE\", \"LIPASE\", \"LDH\" in the last 168 hours.    Invalid input(s): \"ALPHOS\", \"TBIL\", \"DBIL\", \"TPROT\"    No results for input(s): \"TROP\" in the last 168 hours.    Allergies:   No Known Allergies    Medications:  Current Facility-Administered  Medications   Medication Dose Route Frequency    atropine 0.1 MG/ML injection        atorvastatin (Lipitor) tab 40 mg  40 mg Oral Nightly    acetaminophen (Tylenol Extra Strength) tab 500 mg  500 mg Oral Q4H PRN    ondansetron (Zofran) 4 MG/2ML injection 4 mg  4 mg Intravenous Q6H PRN    metoclopramide (Reglan) 5 mg/mL injection 10 mg  10 mg Intravenous Q8H PRN    temazepam (Restoril) cap 15 mg  15 mg Oral Nightly PRN    metoprolol tartrate (Lopressor) tab 25 mg  25 mg Oral 2x Daily(Beta Blocker)    ticagrelor (Brilinta) tab 90 mg  90 mg Oral Q12H    aspirin DR tab 81 mg  81 mg Oral Daily         Assessment:  Acute anterior ST elevation MI, initial presentation  S/p LAD stents x 3  Reduced ejection fraction with anterior wall hypokinesis      Plan:  Continue aspirin and Brilinta uninterrupted  2D echocardiogram today  Add moderate dose of statin  Okay to transfer out of the ICU today    Darren Cohen MD  5/4/2024  8:00 AM    3

## 2024-05-05 LAB
ANION GAP SERPL CALC-SCNC: 6 MMOL/L (ref 0–18)
BASOPHILS # BLD AUTO: 0.03 X10(3) UL (ref 0–0.2)
BASOPHILS NFR BLD AUTO: 0.3 %
BUN BLD-MCNC: 11 MG/DL (ref 9–23)
BUN/CREAT SERPL: 11.8 (ref 10–20)
CALCIUM BLD-MCNC: 8.9 MG/DL (ref 8.7–10.4)
CHLORIDE SERPL-SCNC: 108 MMOL/L (ref 98–112)
CO2 SERPL-SCNC: 24 MMOL/L (ref 21–32)
CREAT BLD-MCNC: 0.93 MG/DL
DEPRECATED RDW RBC AUTO: 40.2 FL (ref 35.1–46.3)
EGFRCR SERPLBLD CKD-EPI 2021: 87 ML/MIN/1.73M2 (ref 60–?)
EOSINOPHIL # BLD AUTO: 0.09 X10(3) UL (ref 0–0.7)
EOSINOPHIL NFR BLD AUTO: 0.9 %
ERYTHROCYTE [DISTWIDTH] IN BLOOD BY AUTOMATED COUNT: 12.4 % (ref 11–15)
GLUCOSE BLD-MCNC: 99 MG/DL (ref 70–99)
HCT VFR BLD AUTO: 42.4 %
HGB BLD-MCNC: 14.5 G/DL
IMM GRANULOCYTES # BLD AUTO: 0.04 X10(3) UL (ref 0–1)
IMM GRANULOCYTES NFR BLD: 0.4 %
LYMPHOCYTES # BLD AUTO: 2.18 X10(3) UL (ref 1–4)
LYMPHOCYTES NFR BLD AUTO: 21.6 %
MCH RBC QN AUTO: 30.1 PG (ref 26–34)
MCHC RBC AUTO-ENTMCNC: 34.2 G/DL (ref 31–37)
MCV RBC AUTO: 88 FL
MONOCYTES # BLD AUTO: 1.11 X10(3) UL (ref 0.1–1)
MONOCYTES NFR BLD AUTO: 11 %
NEUTROPHILS # BLD AUTO: 6.63 X10 (3) UL (ref 1.5–7.7)
NEUTROPHILS # BLD AUTO: 6.63 X10(3) UL (ref 1.5–7.7)
NEUTROPHILS NFR BLD AUTO: 65.8 %
OSMOLALITY SERPL CALC.SUM OF ELEC: 285 MOSM/KG (ref 275–295)
PLATELET # BLD AUTO: 199 10(3)UL (ref 150–450)
POTASSIUM SERPL-SCNC: 3.9 MMOL/L (ref 3.5–5.1)
RBC # BLD AUTO: 4.82 X10(6)UL
SODIUM SERPL-SCNC: 138 MMOL/L (ref 136–145)
WBC # BLD AUTO: 10.1 X10(3) UL (ref 4–11)

## 2024-05-05 PROCEDURE — 99233 SBSQ HOSP IP/OBS HIGH 50: CPT | Performed by: HOSPITALIST

## 2024-05-05 NOTE — PROGRESS NOTES
[facility]  Progress Note     Harry Parra  : 1950    Status: Inpatient  Day #: 2    Attending: Balaji Valverde MD  PCP: Vargas Tony MD     Assessment and Plan:    STEMI  -cardiology consulted  -s/p LAD stents x3  -cont asa, brilinta, statin, metoprolol as BP tolerates  -f/u echo - EF 35-40%, G1DD, moderate HK mid-apical naterior and mid anterolateral walls and apex  -metoprolol was held due to low BP     HTN  -cont metoprolol and monitor     Nonfasting hyperglycemia  -check a1c       DVT Mechanical Prophylaxis:   SCDs,    DVT Pharmacologic Prophylaxis   Medication   None         DVT Pharmacologic prophylaxis: Aspirin 81 mg     Subjective:      Initial Chief Complaint:  chest pain    No CP, no SOB, no dizziness.     10 point ROS completed and was negative, except for pertinent positive and negatives stated in subjective.      Objective:      Temp:  [97.3 °F (36.3 °C)-99.3 °F (37.4 °C)] 97.6 °F (36.4 °C)  Pulse:  [67-91] 72  Resp:  [11-25] 18  BP: ()/(54-94) 106/66  SpO2:  [93 %-98 %] 96 %  General:  Alert, no distress  HEENT:  Normocephalic, atraumatic  Cardiac:  Regular rate, regular rhythm  Pulmonary:  Clear to auscultation bilaterally, respirations unlabored  Gastrointestinal:  Soft, non-tender, normal bowel sounds  Musculoskeletal:  No joint swelling  Extremities:  No edema, no cyanosis, no clubbing  Neurologic:  nonfocal  Psychiatric:  Normal affect, calm and appropriate    Intake/Output Summary (Last 24 hours) at 2024 0835  Last data filed at 2024 0525  Gross per 24 hour   Intake 1170 ml   Output 1750 ml   Net -580 ml         Recent Labs   Lab 24  1745 24  2252 24  0404 24  0500   WBC 7.9  --  11.5* 10.1   HGB 17.0 15.0 14.8 14.5   HCT 48.6 42.9 43.0 42.4   .0  --  244.0 199.0   RBC 5.63  --  4.98 4.82   MCV 86.3  --  86.3 88.0   MCH 30.2  --  29.7 30.1   MCHC 35.0  --  34.4 34.2   RDW 12.0  --  12.2 12.4   NEPRELIM 5.34  --  9.55* 6.63      Recent Labs   Lab 05/03/24  1745 05/04/24  0250 05/04/24  0404 05/05/24  0500   BUN 11  --  11 11   CREATSERUM 1.11  --  1.01 0.93   CA 9.6  --  8.6* 8.9     --  137 138   K 4.2  --  4.2 3.9     --  107 108   CO2 22.0  --  20.0* 24.0   *  --  134* 99   PTT 34.0 31.5  --   --        XR CHEST AP PORTABLE  (CPT=71045)    Result Date: 5/3/2024  CONCLUSION:  No radiographic evidence of acute cardiopulmonary process.    Dictated by (CST): Abby Muñoz MD on 5/03/2024 at 6:31 PM     Finalized by (CST): Abby Muñoz MD on 5/03/2024 at 6:32 PM         EKG 12 Lead    Result Date: 5/4/2024  Sinus tachycardia Left axis deviation Low voltage QRS, consider pulmonary disease, pericardial effusion, or normal variant Cannot rule out Anterior infarct (cited on or before 03-MAY-2024) Abnormal ECG When compared with ECG of 03-MAY-2024 17:34, No significant change was found Confirmed by JIMENA YOUNGER PRATIK ([id number]) on 5/4/2024 12:07:08 PM    EKG 12 Lead    Result Date: 5/4/2024  Sinus tachycardia Low voltage QRS, consider pulmonary disease, pericardial effusion, or normal variant Cannot rule out Anterior infarct , age undetermined Abnormal ECG No previous ECGs found in [facility] Confirmed by JIMENA YOUNGER PRATIK ([id number]) on 5/4/2024 12:06:50 PM   Medications:   atorvastatin  40 mg Oral Nightly    pantoprazole  40 mg Oral QAM AC    metoprolol tartrate  25 mg Oral 2x Daily(Beta Blocker)    ticagrelor  90 mg Oral Q12H    aspirin  81 mg Oral Daily      PRN Meds:   acetaminophen    ondansetron    metoclopramide    temazepam    Supplementary Documentation:        MDM High. Time spent on chart/note review, review of labs/imaging, discussion with patient, physical exam, discussion with staff, consultants, coordinating care, writing progress note, discussion of plan of care.      Balaji Valverde MD

## 2024-05-05 NOTE — PROGRESS NOTES
Cardiology Progress Note    Harry Parra Patient Status:  Inpatient    1950 MRN [id number]   Location [facility] 2W/SW Attending Balaji Valverde MD   Hosp Day # 2 PCP Vargas Tony MD       Subjective: Denies any new symptoms, awaiting bed for telemetry    Objective:   Temp: 97.6 °F (36.4 °C)  Pulse: 72  Resp: 18  BP: 106/66    Intake/Output:     Intake/Output Summary (Last 24 hours) at 2024 0810  Last data filed at 2024 0525  Gross per 24 hour   Intake 1170 ml   Output 1750 ml   Net -580 ml       Last 3 Weights   24 0525 186 lb 4.6 oz (84.5 kg)   24 0536 190 lb 11.2 oz (86.5 kg)   24 1740 190 lb (86.2 kg)   23 1350 192 lb (87.1 kg)   22 0918 188 lb (85.3 kg)       Tele: NSR     Physical Exam:     General: Alert and oriented x 3. No apparent distress. No respiratory or constitutional distress.  HEENT: Normocephalic, anicteric sclera, neck supple.  Neck: No JVD, carotids 2+, no bruits.  Cardiac: Regular rate and rhythm. S1, S2 normal. No murmur, pericardial rub, S3.  Lungs: Clear without wheezes, rales, rhonchi or dullness.  Normal excursions and effort.  Abdomen: Soft, non-tender. BS-present.  Extremities: Without clubbing, cyanosis or edema.  Peripheral pulses are 2+.  Neurologic: Alert and oriented, normal affect.  Skin: Warm and dry.     Laboratory/Data:    Labs:         Recent Labs   Lab 24  0404 24  0500   WBC 7.9  --  11.5* 10.1   HGB 17.0 15.0 14.8 14.5   MCV 86.3  --  86.3 88.0   .0  --  244.0 199.0       Recent Labs   Lab 24  0404 24  0500    137 138   K 4.2 4.2 3.9    107 108   CO2 22.0 20.0* 24.0   BUN 11 11 11   CREATSERUM 1.11 1.01 0.93   CA 9.6 8.6* 8.9   * 134* 99       No results for input(s): \"ALT\", \"AST\", \"ALB\", \"AMYLASE\", \"LIPASE\", \"LDH\" in the last 168 hours.    Invalid input(s): \"ALPHOS\", \"TBIL\", \"DBIL\", \"TPROT\"    No results for input(s): \"TROP\" in  the last 168 hours.    Allergies:   No Known Allergies    Medications:  Current Facility-Administered Medications   Medication Dose Route Frequency    atorvastatin (Lipitor) tab 40 mg  40 mg Oral Nightly    pantoprazole (Protonix) DR tab 40 mg  40 mg Oral QAM AC    acetaminophen (Tylenol Extra Strength) tab 500 mg  500 mg Oral Q4H PRN    ondansetron (Zofran) 4 MG/2ML injection 4 mg  4 mg Intravenous Q6H PRN    metoclopramide (Reglan) 5 mg/mL injection 10 mg  10 mg Intravenous Q8H PRN    temazepam (Restoril) cap 15 mg  15 mg Oral Nightly PRN    metoprolol tartrate (Lopressor) tab 25 mg  25 mg Oral 2x Daily(Beta Blocker)    ticagrelor (Brilinta) tab 90 mg  90 mg Oral Q12H    aspirin DR tab 81 mg  81 mg Oral Daily         Assessment:  Acute anterior ST elevation MI, initial presentation  S/p LAD stents x 3  Reduced ejection fraction with anterior wall hypokinesis      Plan:  Continue aspirin and Brilinta uninterrupted  Echo results reviewed with the patient  Add moderate dose of statin  Change metoprolol to tartrate to metoprolol succinate  Will add Entresto prior to discharge    Darren Cohen MD      3

## 2024-05-05 NOTE — PLAN OF CARE
Patient A&Ox4. All vital signs stable, pm dose of metoprolol held by melly and notified APRN, afebrile, on room air. Patient reports no chest pain or SOB. Patient ambulated to the bathroom and around the room, independent, tolerated well. No events overnight. Patient comfortable with no complaints at this time. All appropriate safety measures in place.    Problem: Patient Centered Care  Goal: Patient preferences are identified and integrated in the patient's plan of care  Description: Interventions:  - What would you like us to know as we care for you? I'm excited to go back home  - Provide timely, complete, and accurate information to patient/family  - Incorporate patient and family knowledge, values, beliefs, and cultural backgrounds into the planning and delivery of care  - Encourage patient/family to participate in care and decision-making at the level they choose  - Honor patient and family perspectives and choices  Outcome: Progressing     Problem: Patient/Family Goals  Goal: Patient/Family Long Term Goal  Description: Patient's Long Term Goal: Get back to my normal routine at home    Interventions:  - Encourage participation in care plan  - See additional Care Plan goals for specific interventions  Outcome: Progressing  Goal: Patient/Family Short Term Goal  Description: Patient's Short Term Goal: Be able to get some sleep    Interventions:   - Encourage participation in care plan  - See additional Care Plan goals for specific interventions  Outcome: Progressing     Problem: CARDIOVASCULAR - ADULT  Goal: Maintains optimal cardiac output and hemodynamic stability  Description: INTERVENTIONS:  - Monitor vital signs, rhythm, and trends  - Monitor for bleeding, hypotension and signs of decreased cardiac output  - Evaluate effectiveness of vasoactive medications to optimize hemodynamic stability  - Monitor arterial and/or venous puncture sites for bleeding and/or hematoma  - Assess quality of pulses, skin color  and temperature  - Assess for signs of decreased coronary artery perfusion - ex. Angina  - Evaluate fluid balance, assess for edema, trend weights  Outcome: Progressing  Goal: Absence of cardiac arrhythmias or at baseline  Description: INTERVENTIONS:  - Continuous cardiac monitoring, monitor vital signs, obtain 12 lead EKG if indicated  - Evaluate effectiveness of antiarrhythmic and heart rate control medications as ordered  - Initiate emergency measures for life threatening arrhythmias  - Monitor electrolytes and administer replacement therapy as ordered  Outcome: Progressing     Problem: SKIN/TISSUE INTEGRITY - ADULT  Goal: Skin integrity remains intact  Description: INTERVENTIONS  - Assess and document risk factors for pressure ulcer development  - Assess and document skin integrity  - Monitor for areas of redness and/or skin breakdown  - Initiate interventions, skin care algorithm/standards of care as needed  Outcome: Progressing     Problem: SAFETY ADULT - FALL  Goal: Free from fall injury  Description: INTERVENTIONS:  - Assess pt frequently for physical needs  - Identify cognitive and physical deficits and behaviors that affect risk of falls.  - [place] fall precautions as indicated by assessment.  - Educate pt/family on patient safety including physical limitations  - Instruct pt to call for assistance with activity based on assessment  - Modify environment to reduce risk of injury  - Provide assistive devices as appropriate  - Consider OT/PT consult to assist with strengthening/mobility  - Encourage toileting schedule  Outcome: Progressing

## 2024-05-05 NOTE — PLAN OF CARE
Transfer from CCU. Patient is alert and oriented x4 and on RA. No complaints of pain. Frequent rounding done throughout the shift. Safety precautions in place.    Problem: Patient Centered Care  Goal: Patient preferences are identified and integrated in the patient's plan of care  Description: Interventions:  - What would you like us to know as we care for you?   - Provide timely, complete, and accurate information to patient/family  - Incorporate patient and family knowledge, values, beliefs, and cultural backgrounds into the planning and delivery of care  - Encourage patient/family to participate in care and decision-making at the level they choose  - Honor patient and family perspectives and choices  Outcome: Progressing     Problem: CARDIOVASCULAR - ADULT  Goal: Maintains optimal cardiac output and hemodynamic stability  Description: INTERVENTIONS:  - Monitor vital signs, rhythm, and trends  - Monitor for bleeding, hypotension and signs of decreased cardiac output  - Evaluate effectiveness of vasoactive medications to optimize hemodynamic stability  - Monitor arterial and/or venous puncture sites for bleeding and/or hematoma  - Assess quality of pulses, skin color and temperature  - Assess for signs of decreased coronary artery perfusion - ex. Angina  - Evaluate fluid balance, assess for edema, trend weights  Outcome: Progressing  Goal: Absence of cardiac arrhythmias or at baseline  Description: INTERVENTIONS:  - Continuous cardiac monitoring, monitor vital signs, obtain 12 lead EKG if indicated  - Evaluate effectiveness of antiarrhythmic and heart rate control medications as ordered  - Initiate emergency measures for life threatening arrhythmias  - Monitor electrolytes and administer replacement therapy as ordered  Outcome: Progressing     Problem: SKIN/TISSUE INTEGRITY - ADULT  Goal: Skin integrity remains intact  Description: INTERVENTIONS  - Assess and document risk factors for pressure ulcer development  -  Assess and document skin integrity  - Monitor for areas of redness and/or skin breakdown  - Initiate interventions, skin care algorithm/standards of care as needed  Outcome: Progressing     Problem: SAFETY ADULT - FALL  Goal: Free from fall injury  Description: INTERVENTIONS:  - Assess pt frequently for physical needs  - Identify cognitive and physical deficits and behaviors that affect risk of falls.  - [place] fall precautions as indicated by assessment.  - Educate pt/family on patient safety including physical limitations  - Instruct pt to call for assistance with activity based on assessment  - Modify environment to reduce risk of injury  - Provide assistive devices as appropriate  - Consider OT/PT consult to assist with strengthening/mobility  - Encourage toileting schedule  Outcome: Progressing

## 2024-05-05 NOTE — PLAN OF CARE
Problem: CARDIOVASCULAR - ADULT  Goal: Maintains optimal cardiac output and hemodynamic stability  Description: INTERVENTIONS:  - Monitor vital signs, rhythm, and trends  - Monitor for bleeding, hypotension and signs of decreased cardiac output  - Evaluate effectiveness of vasoactive medications to optimize hemodynamic stability  - Monitor arterial and/or venous puncture sites for bleeding and/or hematoma  - Assess quality of pulses, skin color and temperature  - Assess for signs of decreased coronary artery perfusion - ex. Angina  - Evaluate fluid balance, assess for edema, trend weights  Outcome: Progressing

## 2024-05-05 NOTE — SPIRITUAL CARE NOTE
Sig. other Tracy requested for  visit and requested for prayer. According to family, Pt had complaints of intermittent chest pain for the past 3 days. This writer provided spiritual and emotional support. Pt and family practice [facility].

## 2024-05-06 ENCOUNTER — TELEPHONE (OUTPATIENT)
Dept: FAMILY MEDICINE CLINIC | Facility: CLINIC | Age: 74
End: 2024-05-06

## 2024-05-06 VITALS
SYSTOLIC BLOOD PRESSURE: 103 MMHG | RESPIRATION RATE: 18 BRPM | DIASTOLIC BLOOD PRESSURE: 70 MMHG | WEIGHT: 186 LBS | OXYGEN SATURATION: 96 % | HEART RATE: 70 BPM | BODY MASS INDEX: 25 KG/M2 | TEMPERATURE: 100 F

## 2024-05-06 PROCEDURE — 99239 HOSP IP/OBS DSCHRG MGMT >30: CPT | Performed by: HOSPITALIST

## 2024-05-06 RX ORDER — ATORVASTATIN CALCIUM 40 MG/1
40 TABLET, FILM COATED ORAL NIGHTLY
Qty: 30 TABLET | Refills: 11 | Status: SHIPPED | OUTPATIENT
Start: 2024-05-06

## 2024-05-06 RX ORDER — METOPROLOL SUCCINATE 50 MG/1
50 TABLET, EXTENDED RELEASE ORAL DAILY
Qty: 30 TABLET | Refills: 5 | Status: SHIPPED | OUTPATIENT
Start: 2024-05-06

## 2024-05-06 RX ORDER — ASPIRIN 81 MG/1
81 TABLET ORAL DAILY
Qty: 30 TABLET | Refills: 11 | Status: SHIPPED | OUTPATIENT
Start: 2024-05-07

## 2024-05-06 NOTE — DISCHARGE SUMMARY
[facility]  Discharge Summary     Harry Parra  : 1950    Status: Inpatient  Day #: 3    Attending: Balaji Valverde MD  PCP: Vargas Tony MD     Date of Admission:  5/3/2024  Date of Discharge:  2024     Hospital Discharge Diagnoses:     STEMI  Ischemic Cardiomyopathy  H/o HTN      History of Present Illness:     Copied from Admission H&P:    Patient is a 74-year-old  male who came into the emergency department with midsternal chest pain radiating to bilateral shoulders on and off since this morning and noted to have ST elevation in the anterolateral leads. CBC was unremarkable. Troponin still pending. Started on IV heparin and nitroglycerin drip and aspirin, and he will be taken to the catheterization lab by Cardiology for cardiac angiogram.       Hospital Course:     STEMI  -cardiology consulted  -s/p LAD stents x3  -cont asa, brilinta, statin, metoprolol as BP tolerates  -f/u echo - EF 35-40%, G1DD, moderate HK mid-apical naterior and mid anterolateral walls and apex  -cont metoprolol   -no arb or entresto due to low BP     HTN  -cont metoprolol and monitor     Nonfasting hyperglycemia  -check A1c - ok     Consultants         Provider   Role Specialty     Aubrey Zaidi MD      Consulting Physician CARDIOLOGY             Physical Exam:   Blood pressure 103/70, pulse 70, temperature 99.8 °F (37.7 °C), temperature source Oral, resp. rate 18, weight 186 lb (84.4 kg), SpO2 96%.  General:  Alert, no distress  HEENT:  Normocephalic, atraumatic  Cardiac:  Regular rate, regular rhythm  Pulmonary:  Clear to auscultation bilaterally, respirations unlabored  Gastrointestinal:  Soft, non-tender, normal bowel sounds  Musculoskeletal:  No joint swelling  Extremities:  No edema, no cyanosis, no clubbing  Neurologic:  nonfocal  Psychiatric:  Normal affect, calm and appropriate         Discharge Medications        START taking these medications        Instructions Prescription details   aspirin 81  MG Tbec  Start taking on: May 7, 2024      Take 1 tablet (81 mg total) by mouth daily.   Quantity: 30 tablet  Refills: 11     atorvastatin 40 MG Tabs  Commonly known as: Lipitor      Take 1 tablet (40 mg total) by mouth nightly.   Quantity: 30 tablet  Refills: 11     metoprolol succinate ER 50 MG Tb24  Commonly known as: Toprol XL      Take 1 tablet (50 mg total) by mouth daily.   Quantity: 30 tablet  Refills: 5     ticagrelor 90 MG Tabs  Commonly known as: Brilinta      Take 1 tablet (90 mg total) by mouth every 12 (twelve) hours.   Quantity: 60 tablet  Refills: 11            CONTINUE taking these medications        Instructions Prescription details   FISH OIL OR      Take by mouth.   Refills: 0     VITAMIN C OR      Take by mouth.   Refills: 0     VITAMIN D OR      Take by mouth.   Refills: 0     ZINC OR      Use as directed in the mouth or throat.   Refills: 0               Where to Get Your Medications        Please  your prescriptions at the location directed by your doctor or nurse    Bring a paper prescription for each of these medications  aspirin 81 MG Tbec  atorvastatin 40 MG Tabs  metoprolol succinate ER 50 MG Tb24  ticagrelor 90 MG Tabs        Follow-up Information       Aubrey Zaidi MD Follow up.    Specialty: CARDIOLOGY  Why: our office will call you for an appointment  Contact information:  [address]  [place] [id number] [phone]                             Hospital Discharge Diagnoses:  STEMI    Lace+ Score: 46  59-90 High Risk  29-58 Medium Risk  0-28   Low Risk.    TCM Follow-Up Recommendation:  LACE 29-58: Moderate Risk of readmission after discharge from the hospital.        I spent >30 minutes on this discharge. Discussed treatment and discharge plans.       Balaji Valverde MD

## 2024-05-06 NOTE — CARDIAC REHAB
Cardiac Rehab Phase I    Activity:  Up in room during consult    Education:  Handouts provided and reviewed: [facility] Booklet, Stent ID Card, Cardiac Rehab appointment reminder      Disease Process: Reviewed understanding stable vs unstable anginal symptoms, treatment of CAD with drug eluting stents, antiplatelet medications. Home walking Program,     Reviewed the following: SITE CARE: Reviewed       RISK FACTORS: Discussed      SMOKING CESSATION: Discussed as applicable      HOME EXERCISE ACTIVITY: Home Walking Program      OUTPATIENT CARDIAC REHAB: Referred to Cardiac Rehabilitation Phase II to begin Wed May 22nd orientation

## 2024-05-06 NOTE — PROGRESS NOTES
[facility]  Progress Note     Harry Parra  : 1950    Status: Inpatient  Day #: 3    Attending: Balaji Valverde MD  PCP: Vargas Tony MD     Assessment and Plan:    STEMI  -cardiology consulted  -s/p LAD stents x3  -cont asa, brilinta, statin, metoprolol as BP tolerates  -f/u echo - EF 35-40%, G1DD, moderate HK mid-apical naterior and mid anterolateral walls and apex  -cont metoprolol   -no arb or entresto due to low BP     HTN  -cont metoprolol and monitor     Nonfasting hyperglycemia  -check A1c - ok    Dispo: discharge home when ok with cardiology     DVT Mechanical Prophylaxis:   SCDs,    DVT Pharmacologic Prophylaxis   Medication   None         DVT Pharmacologic prophylaxis: Aspirin 81 mg     Subjective:      Initial Chief Complaint:  chest pain    No CP, no SOB, no dizziness.     10 point ROS completed and was negative, except for pertinent positive and negatives stated in subjective.      Objective:      Temp:  [98.7 °F (37.1 °C)-99.8 °F (37.7 °C)] 99.8 °F (37.7 °C)  Pulse:  [70-79] 70  Resp:  [18-25] 18  BP: (103-126)/(64-76) 103/70  SpO2:  [96 %-97 %] 96 %  General:  Alert, no distress  HEENT:  Normocephalic, atraumatic  Cardiac:  Regular rate, regular rhythm  Pulmonary:  Clear to auscultation bilaterally, respirations unlabored  Gastrointestinal:  Soft, non-tender, normal bowel sounds  Musculoskeletal:  No joint swelling  Extremities:  No edema, no cyanosis, no clubbing  Neurologic:  nonfocal  Psychiatric:  Normal affect, calm and appropriate    Intake/Output Summary (Last 24 hours) at 2024 1138  Last data filed at 2024 1046  Gross per 24 hour   Intake 490 ml   Output 150 ml   Net 340 ml         Recent Labs   Lab 24  1745 24  2252 24  0404 24  0500   WBC 7.9  --  11.5* 10.1   HGB 17.0 15.0 14.8 14.5   HCT 48.6 42.9 43.0 42.4   .0  --  244.0 199.0   RBC 5.63  --  4.98 4.82   MCV 86.3  --  86.3 88.0   MCH 30.2  --  29.7 30.1   MCHC 35.0  --  34.4 34.2    RDW 12.0  --  12.2 12.4   NEPRELIM 5.34  --  9.55* 6.63     Recent Labs   Lab 05/03/24  1745 05/04/24  0250 05/04/24  0404 05/05/24  0500   BUN 11  --  11 11   CREATSERUM 1.11  --  1.01 0.93   CA 9.6  --  8.6* 8.9     --  137 138   K 4.2  --  4.2 3.9     --  107 108   CO2 22.0  --  20.0* 24.0   *  --  134* 99   PTT 34.0 31.5  --   --        No results found.      Medications:   atorvastatin  40 mg Oral Nightly    pantoprazole  40 mg Oral QAM AC    metoprolol tartrate  25 mg Oral 2x Daily(Beta Blocker)    ticagrelor  90 mg Oral Q12H    aspirin  81 mg Oral Daily      PRN Meds:   acetaminophen    ondansetron    metoclopramide    temazepam    Supplementary Documentation:        MDM High. Time spent on chart/note review, review of labs/imaging, discussion with patient, physical exam, discussion with staff, consultants, coordinating care, writing progress note, discussion of plan of care.      Balaji Valverde MD

## 2024-05-06 NOTE — TELEPHONE ENCOUNTER
Would have to use multiple res 24 slots as TCM is usually 30 minute appt. Can use multiple slots if available.

## 2024-05-06 NOTE — DISCHARGE PLANNING
Patient was provided with discharge instructions, education, and follow up information. Patient's life partner present for discharge instructions with patient's consent. Printed prescriptions were given to patient. Prescriptions faxed to [facility] per patient request.  Spoke to pharmacy staff, Brilinta is in stock. Unable to check cost of brilinta due to pharmacy still entering faxed over scripts and will take at least 1 hour per pharmacy. Patient wanting to leave now. Brilinta coupon given to patient. Patient verbalizes understanding of follow up information, specifically PCP and cardiology. Patient has no questions after reviewing all instructions and will be going home.     Shaylee RIBEIRO, Discharge Leader [id number]

## 2024-05-06 NOTE — PLAN OF CARE
Problem: Patient Centered Care  Goal: Patient preferences are identified and integrated in the patient's plan of care  Description: Interventions:  - What would you like us to know as we care for you? From home with wife  - Provide timely, complete, and accurate information to patient/family  - Incorporate patient and family knowledge, values, beliefs, and cultural backgrounds into the planning and delivery of care  - Encourage patient/family to participate in care and decision-making at the level they choose  - Honor patient and family perspectives and choices  Outcome: Progressing     Problem: Patient/Family Goals  Goal: Patient/Family Long Term Goal  Description: Patient's Long Term Goal: go home    Interventions:  - monitor HR  - scheduled meds  - See additional Care Plan goals for specific interventions  Outcome: Progressing  Goal: Patient/Family Short Term Goal  Description: Patient's Short Term Goal: No pain    Interventions:   - monitor pain  - manage pain  - See additional Care Plan goals for specific interventions  Outcome: Progressing     Problem: CARDIOVASCULAR - ADULT  Goal: Maintains optimal cardiac output and hemodynamic stability  Description: INTERVENTIONS:  - Monitor vital signs, rhythm, and trends  - Monitor for bleeding, hypotension and signs of decreased cardiac output  - Evaluate effectiveness of vasoactive medications to optimize hemodynamic stability  - Monitor arterial and/or venous puncture sites for bleeding and/or hematoma  - Assess quality of pulses, skin color and temperature  - Assess for signs of decreased coronary artery perfusion - ex. Angina  - Evaluate fluid balance, assess for edema, trend weights  Outcome: Progressing  Goal: Absence of cardiac arrhythmias or at baseline  Description: INTERVENTIONS:  - Continuous cardiac monitoring, monitor vital signs, obtain 12 lead EKG if indicated  - Evaluate effectiveness of antiarrhythmic and heart rate control medications as ordered  -  Initiate emergency measures for life threatening arrhythmias  - Monitor electrolytes and administer replacement therapy as ordered  Outcome: Progressing     Problem: SKIN/TISSUE INTEGRITY - ADULT  Goal: Skin integrity remains intact  Description: INTERVENTIONS  - Assess and document risk factors for pressure ulcer development  - Assess and document skin integrity  - Monitor for areas of redness and/or skin breakdown  - Initiate interventions, skin care algorithm/standards of care as needed  Outcome: Progressing     Problem: SAFETY ADULT - FALL  Goal: Free from fall injury  Description: INTERVENTIONS:  - Assess pt frequently for physical needs  - Identify cognitive and physical deficits and behaviors that affect risk of falls.  - [place] fall precautions as indicated by assessment.  - Educate pt/family on patient safety including physical limitations  - Instruct pt to call for assistance with activity based on assessment  - Modify environment to reduce risk of injury  - Provide assistive devices as appropriate  - Consider OT/PT consult to assist with strengthening/mobility  - Encourage toileting schedule  Outcome: Progressing

## 2024-05-06 NOTE — TELEPHONE ENCOUNTER
Patient's partner Tracy calling to provide a condition update about patient.     Patient had a massive heart attack last Thursday, May 2nd, drove himself to the emergency room around 4:30 pm.   Was told patient had a STEMI, heart stopped during angioplasty, but Dr was able to get it restarted  Has been in ICU until yesterday and then moved to a standard room since then, has been responding well, may be discharged today.  Tracy will be his caretaker at home for the time being.    Harry will be home today, so may call either him or Tracy to discuss situation.     Will need a TCM follow-up with Dr Tony, schedule does not give any openings until 5/15, are we able to accommodate an earlier appointment?    Also, scheduled for physical on 5/16, unsure if he needs to keep it at this time, please discuss with patient.

## 2024-05-06 NOTE — PROGRESS NOTES
Progress Note  Harry Parra Patient Status:  Inpatient    1950 MRN [id number]   Location [facility] 3W/SW Attending Balaji Valverde MD   Hosp Day # 3 PCP Vargas Tony MD     Subjective:  Patient denies chest pain or SOB    Objective:  /70 (BP Location: Right arm)   Pulse 70   Temp 99.8 °F (37.7 °C) (Oral)   Resp 18   Wt 186 lb (84.4 kg)   SpO2 96%   BMI 25.23 kg/m²     Telemetry: SR, PAT      Intake/Output: +200        Last 3 Weights   24 0540 186 lb (84.4 kg)   24 0525 186 lb 4.6 oz (84.5 kg)   24 0536 190 lb 11.2 oz (86.5 kg)   24 1740 190 lb (86.2 kg)   23 1350 192 lb (87.1 kg)   22 0918 188 lb (85.3 kg)       Labs:  Recent Labs   Lab 24  0404 24  0500   * 134* 99   BUN 11 11 11   CREATSERUM 1.11 1.01 0.93   EGFRCR 71 79 87   CA 9.6 8.6* 8.9    137 138   K 4.2 4.2 3.9    107 108   CO2 22.0 20.0* 24.0     Recent Labs   Lab 24  2252 24  0404 24  0500   RBC 5.63  --  4.98 4.82   HGB 17.0 15.0 14.8 14.5   HCT 48.6 42.9 43.0 42.4   MCV 86.3  --  86.3 88.0   MCH 30.2  --  29.7 30.1   MCHC 35.0  --  34.4 34.2   RDW 12.0  --  12.2 12.4   NEPRELIM 5.34  --  9.55* 6.63   WBC 7.9  --  11.5* 10.1   .0  --  244.0 199.0         Recent Labs   Lab 24   TROPHS 349*     No results found for: \"PT\", \"INR\"    Diagnostics:   Echo:   Conclusions:     1. Left ventricle: The cavity size was normal. Wall thickness was normal.      Systolic function was normal. The estimated ejection fraction was 35-40%,      by visual assessment. Doppler parameters are consistent with abnormal      left ventricular relaxation - grade 1 diastolic dysfunction.   2. Left ventricle: There is moderate hypokinesis of the mid-apical anterior      and mid anterolateral walls and the apex.   3. Mitral valve: There was mild regurgitation.   4. Pulmonary arteries: Systolic pressure was within the normal range.    Impressions:  No previous study from [facility] was   available for comparison.     Review of Systems   Constitutional: Negative.   Cardiovascular: Negative.    Respiratory: Negative.         Physical Exam:    Gen: Alert, oriented x 3, in no apparent distress  Heent: Pupils equal, reactive. Mucous membranes moist.   Cardiac: Regular rate and rhythm, normal S1,S2  Lungs: Clear to auscultation  Abd: Soft, non tender, non distended  Ext: No edema  Skin: Warm, dry  Neuro: No focal deficits  Right groin soft, no hematoma, dressing removed      Medications:     atorvastatin  40 mg Oral Nightly    pantoprazole  40 mg Oral QAM AC    metoprolol tartrate  25 mg Oral 2x Daily(Beta Blocker)    ticagrelor  90 mg Oral Q12H    aspirin  81 mg Oral Daily             Assessment:  Acute anterior STEMI  S/p/ LHC 5/3/24 with TONE LAD  On ASA, brilinta, BB, statin   New LV dysfunction, LVEF 35-40% with moderate hypokinesis of the mid apical anterior and mid anterolateral wall   New to BB  HTN - /70  Hyperlipidemia    Plan:  Would like to add ARB versus entresto however blood pressure is limiting the advancement of GDMT at this time  Continue BB, ASA, brilinta and statin   Discharge planning for today. Follow up Dr. Zaidi  Cardiac rehab to see prior to discharge    Plan of care discussed with patient, RN.    LEIF Holley  5/6/2024  9:24 AM    Addendum:  Patient seen and examined  Agree with the examination and the assessment  Management plan as outlined above  Renny Hazel MD  L3

## 2024-05-06 NOTE — PLAN OF CARE
Patient medically cleared for discharge. Tele and IV removed. Discharge education completed by QUINTIN June.     Problem: Patient Centered Care  Goal: Patient preferences are identified and integrated in the patient's plan of care  Description: Interventions:  - Provide timely, complete, and accurate information to patient/family  - Incorporate patient and family knowledge, values, beliefs, and cultural backgrounds into the planning and delivery of care  - Encourage patient/family to participate in care and decision-making at the level they choose  - Honor patient and family perspectives and choices  Outcome: Adequate for Discharge        Problem: CARDIOVASCULAR - ADULT  Goal: Maintains optimal cardiac output and hemodynamic stability  Description: INTERVENTIONS:  - Monitor vital signs, rhythm, and trends  - Monitor for bleeding, hypotension and signs of decreased cardiac output  - Evaluate effectiveness of vasoactive medications to optimize hemodynamic stability  - Monitor arterial and/or venous puncture sites for bleeding and/or hematoma  - Assess quality of pulses, skin color and temperature  - Assess for signs of decreased coronary artery perfusion - ex. Angina  - Evaluate fluid balance, assess for edema, trend weights  Outcome: Adequate for Discharge  Goal: Absence of cardiac arrhythmias or at baseline  Description: INTERVENTIONS:  - Continuous cardiac monitoring, monitor vital signs, obtain 12 lead EKG if indicated  - Evaluate effectiveness of antiarrhythmic and heart rate control medications as ordered  - Initiate emergency measures for life threatening arrhythmias  - Monitor electrolytes and administer replacement therapy as ordered  Outcome: Adequate for Discharge     Problem: SKIN/TISSUE INTEGRITY - ADULT  Goal: Skin integrity remains intact  Description: INTERVENTIONS  - Assess and document risk factors for pressure ulcer development  - Assess and document skin integrity  - Monitor for areas of redness  and/or skin breakdown  - Initiate interventions, skin care algorithm/standards of care as needed  Outcome: Adequate for Discharge     Problem: SAFETY ADULT - FALL  Goal: Free from fall injury  Description: INTERVENTIONS:  - Assess pt frequently for physical needs  - Identify cognitive and physical deficits and behaviors that affect risk of falls.  - [place] fall precautions as indicated by assessment.  - Educate pt/family on patient safety including physical limitations  - Instruct pt to call for assistance with activity based on assessment  - Modify environment to reduce risk of injury  - Provide assistive devices as appropriate  - Consider OT/PT consult to assist with strengthening/mobility  - Encourage toileting schedule  Outcome: Adequate for Discharge

## 2024-05-07 ENCOUNTER — PATIENT OUTREACH (OUTPATIENT)
Dept: CASE MANAGEMENT | Age: 74
End: 2024-05-07

## 2024-05-07 DIAGNOSIS — Z02.9 ENCOUNTERS FOR UNSPECIFIED ADMINISTRATIVE PURPOSE: Primary | ICD-10-CM

## 2024-05-07 PROCEDURE — 1111F DSCHRG MED/CURRENT MED MERGE: CPT

## 2024-05-07 NOTE — PROGRESS NOTES
NCM placed call to patient for TCM, LM requesting a call back to [phone] with a condition update.    Discharge Dx:   (Copied from Discharge Summary)  STEMI  Ischemic Cardiomyopathy  H/o HTN  S/P LHC, IVUS LAD, aspiration of thrombectomy LAD, PCI LAD 3 stents placed

## 2024-05-07 NOTE — PAYOR COMM NOTE
--------------  ADMISSION REVIEW     Payor: [employer]  Subscriber #:  [id number]  Authorization Number: [id number]    Admit date: 5/3/24  Admit time:  8:35 PM       History   HPI  74 year old male with with history of hypertension, here with complaints of intermittent chest pain for the past 3 days.  The pain was continuous for the past hour, feeling it from axilla to axilla and up his neck, so he decided to come in for further evaluation.  No associated shortness of breath.  He has some mild nausea.  He has never had a heart attack.  No numbness or tingling or weakness of the extremities.    Respiratory:  Negative for shortness of breath.    Cardiovascular:  Positive for chest pain.     ED Triage Vitals   BP 05/03/24 1740 (!) 183/114   Pulse 05/03/24 1740 106   Resp 05/03/24 1740 (!) 38   Temp 05/03/24 2015 98.3 °F (36.8 °C)   Temp src 05/03/24 2015 Temporal   SpO2 05/03/24 1740 97 %   O2 Device 05/03/24 1740 None (Room air)      Comments: Appears uncomfortable   Cardiovascular:      Pulses: Normal pulses.   Pulmonary:      Effort: No respiratory distress.   Chest:      Chest wall: No tenderness.   Abdominal:      General: There is no distension.   Labs Reviewed   BASIC METABOLIC PANEL (8) - Abnormal; Notable for the following components:       Result Value    Glucose 122 (*)     BUN/CREA Ratio 9.9 (*)     All other components within normal limits   TROPONIN I HIGH SENSITIVITY - Abnormal; Notable for the following components:    Troponin I (High Sensitivity) 349 (*)     All other components within normal limits   LIPID PANEL - Abnormal; Notable for the following components:    Cholesterol, Total 251 (*)     HDL Cholesterol 73 (*)     Triglycerides 162 (*)     LDL Cholesterol 149 (*)     VLDL 31 (*)     Non HDL Chol 178 (*)     All other components within normal limits   POCT ISTAT ACTIVATED CLOTTING TIME - Abnormal; Notable for the following components:    ISTAT Activated Clotting Time 217 (*)      All other components within normal limits   POCT ISTAT ACTIVATED CLOTTING TIME - Abnormal; Notable for the following components:    ISTAT Activated Clotting Time 309 (*)    XR CHEST AP PORTABLE  (CPT=71045)  Result Date: 5/3/2024  CONCLUSION:  No radiographic evidence of acute cardiopulmonary process.    Dictated by (CST): Abby Muñoz MD on 5/03/2024 at 6:31 PM     Finalized by (CST): Abby Muñoz MD on 5/03/2024 at 6:32 PM         My review and independent interpretation of XR images: no infiltrate. Radiology report corroborates this in addition to other details as reported by them.    ED Medications Administered:   Medications   nitroGLYCERIN in dextrose 5% 50 mg/250mL infusion premix (10 mcg/min Intravenous Handoff 5/3/24 1840)   heparin (Porcine) 04741 units/250mL infusion ED INITIAL DOSE (12 Units/kg/hr × 86.2 kg Intravenous Handoff 5/3/24 1839)   heparin (Porcine) 04083 units/250mL infusion ACS/AFIB CONTINUOUS (has no administration in time range)   acetaminophen (Tylenol Extra Strength) tab 500 mg (has no administration in time range)   ondansetron (Zofran) 4 MG/2ML injection 4 mg (has no administration in time range)   metoclopramide (Reglan) 5 mg/mL injection 10 mg (has no administration in time range)   temazepam (Restoril) cap 15 mg (has no administration in time range)   metoprolol tartrate (Lopressor) tab 25 mg (has no administration in time range)   amiodarone (Cordarone) 450 mg in dextrose 5% 250 mL infusion (has no administration in time range)     Followed by   amiodarone (Cordarone) 450 mg in dextrose 5% 250 mL infusion (has no administration in time range)   sodium chloride 0.9% infusion (has no administration in time range)   ticagrelor (Brilinta) tab 90 mg (has no administration in time range)   aspirin DR tab 81 mg (has no administration in time range)   aspirin chewable tab 324 mg (324 mg Oral Given 5/3/24 1746)   heparin (Porcine) 1000 UNIT/ML injection - BOLUS IV 5,000 Units (5,000  Units Intravenous Given 5/3/24 1747)   nitroglycerin (Nitrostat) SL tab 0.4 mg (0.4 mg Sublingual Given 5/3/24 1746)   lidocaine PF (Xylocaine-MPF) 2 % injection (has no administration in time range)   heparin in sodium chloride 0.9% (Porcine) 2 Units/mL flush bag premix (has no administration in time range)   heparin (Porcine) 1000 UNIT/ML injection (has no administration in time range)   midazolam (Versed) 2 MG/2ML injection (has no administration in time range)   fentaNYL (Sublimaze) 50 mcg/mL injection (has no administration in time range)   lidocaine PF (Xylocaine-MPF) 2 % injection (has no administration in time range)   heparin in sodium chloride 0.9% (Porcine) 2 Units/mL flush bag premix (has no administration in time range)   heparin in sodium chloride 0.9% (Porcine) 2 Units/mL flush bag premix (has no administration in time range)   diphenhydrAMINE (Benadryl) 50 mg/mL  injection (has no administration in time range)   amiodarone (Cordarone) 150 MG/3ML injection (has no administration in time range)   midazolam (Versed) 2 MG/2ML injection (has no administration in time range)   cangrelor (Kengreal) 50 MG injection (has no administration in time range)   sterile water for injection (PF) injection (has no administration in time range)   Nitroglycerin in D5W 200-5 MCG/ML-% injection (has no administration in time range)   ticagrelor (Brilinta) 90 MG tab (has no administration in time range)   fentaNYL (Sublimaze) 50 mcg/mL injection (has no administration in time range)   iopamidol (ISOVUE-300) 61 % injection 250 mL (250 mL Injection Given 5/3/24 2005)      Disposition and Plan   Clinical Impression:  1. ST elevation myocardial infarction (STEMI), unspecified artery (HCC)      Disposition:  Send to or/cath/gi    HISTORY AND PHYSICAL EXAMINATION     CHIEF COMPLAINT:  ST-elevation myocardial infarction.     HISTORY OF PRESENT ILLNESS:  Patient is a 74-year-old  male who came into the emergency  department with midsternal chest pain radiating to bilateral shoulders on and off since this morning and noted to have ST elevation in the anterolateral leads.  CBC was unremarkable.  Troponin still pending.  Started on IV heparin and nitroglycerin drip and aspirin, and he will be taken to the catheterization lab by Cardiology for cardiac angiogram.     PAST MEDICAL HISTORY:  Patient denies any medical problems.   PAST SURGICAL HISTORY:  Cataract procedure, left retinal detachment procedure, right inguinal hernia repair, basal cell carcinoma resection.   PHYSICAL EXAMINATION:    GENERAL:  Alert and oriented to time, place and person.  Moderate distress.  Very anxious.  VITAL SIGNS:  Temperature 198, pulse 106, respiratory rate 38, blood pressure 180/114, pulse ox 97% on room air.   HEENT:  Atraumatic.  Oropharynx clear.  Dry mucous membranes.  Ears and nose normal.  Eyes:  Anicteric sclerae.    NECK:  Supple.  No lymphadenopathy.  Trachea midline.  Full range of motion.   LUNGS:  Clear to auscultation bilaterally.  Normal respiratory effort.    HEART:  Regular rate and rhythm.  S1 and S2 auscultated.  No murmur.    ABDOMEN:  Soft, nondistended.  No tenderness.  Positive bowel sounds.   EXTREMITIES:  No peripheral edema, clubbing or cyanosis.   NEUROLOGIC:  Motor and sensory intact.       ASSESSMENT:    1.       ST-elevation myocardial infarction.   2.       Hypertension.     PLAN:  Patient will be taken to the catheterization lab urgently by Cardiology for cardiac angiogram.  Started on IV heparin and nitroglycerin and aspirin in the emergency room.  Further recommendations pending cardiac angiogram results.        CARDIOLOGY  Impression:   74-year-old male with anterior ST elevations, chest pain.  History of hypertension.     Recommendations:  1.  ST elevation MI  Anterior elevation suspect LAD stenosis.  Continue heparin and nitro drips.  Proceed with cardiac catheterization risks and benefits explained patient  would like to proceed.  2D echocardiogram with Doppler  Aspirin 325 mg.  2.  Hypertension  Does not take medications at home.  Start metoprolol tartrate 25 mg twice a day  3.  Hyperlipidemia  Check lipid panel      5/4/24  Some upper abd pain relieved after eating. No SOB. No n/v.      Temp:  [98.3 °F (36.8 °C)] 98.3 °F (36.8 °C)  Pulse:  [] 91  Resp:  [13-38] 23  BP: ()/() 121/76  SpO2:  [93 %-97 %] 97 %  Lab 05/03/24 1745 05/03/24 2252 05/04/24  0404   WBC 7.9  --  11.5*   HGB 17.0 15.0 14.8   HCT 48.6 42.9 43.0   .0  --  244.0   RBC 5.63  --  4.98   MCV 86.3  --  86.3   MCH 30.2  --  29.7   MCHC 35.0  --  34.4   RDW 12.0  --  12.2   NEPRELIM 5.34  --  9.55*      Lab 05/03/24 1745 05/04/24 0250 05/04/24  0404   BUN 11  --  11   CREATSERUM 1.11  --  1.01   CA 9.6  --  8.6*     --  137   K 4.2  --  4.2     --  107   CO2 22.0  --  20.0*   *  --  134*   PTT 34.0 31.5  --       Assessment and Plan:     STEMI  -cardiology consulted  -s/p LAD stents x3  -cont asa, brilinta, statin, metoprolol  -f/u echo     HTN  -cont metoprolol and monitor     Nonfasting hyperglycemia  -check a1c     DVT Mechanical Prophylaxis:   SCDs        5/5/24  Temp:  [97.3 °F (36.3 °C)-99.3 °F (37.4 °C)] 97.6 °F (36.4 °C)  Pulse:  [67-91] 72  Resp:  [11-25] 18  BP: ()/(54-94) 106/66  SpO2:  [93 %-98 %] 96 %  Lab 05/03/24 1745 05/03/24 2252 05/04/24  0404 05/05/24  0500   WBC 7.9  --  11.5* 10.1   HGB 17.0 15.0 14.8 14.5   HCT 48.6 42.9 43.0 42.4   .0  --  244.0 199.0   RBC 5.63  --  4.98 4.82   MCV 86.3  --  86.3 88.0   MCH 30.2  --  29.7 30.1   MCHC 35.0  --  34.4 34.2   RDW 12.0  --  12.2 12.4   NEPRELIM 5.34  --  9.55* 6.63      Lab 05/03/24  1745 05/04/24  0250 05/04/24  0404 05/05/24  0500   BUN 11  --  11 11   CREATSERUM 1.11  --  1.01 0.93   CA 9.6  --  8.6* 8.9     --  137 138   K 4.2  --  4.2 3.9     --  107 108   CO2 22.0  --  20.0* 24.0   *  --  134* 99    PTT 34.0 31.5  --   --        XR CHEST AP PORTABLE  (CPT=71045)   Result Date: 5/3/2024  CONCLUSION: No radiographic evidence of acute cardiopulmonary process.    Dictated by (CST): Abby Muñoz MD on 5/03/2024 at 6:31 PM     Finalized by (CST): Abby Muñoz MD on 5/03/2024 at 6:32 PM           EKG 12 Lead  Result Date: 5/4/2024  Sinus tachycardia Left axis deviation Low voltage QRS, consider pulmonary disease, pericardial effusion, or normal variant Cannot rule out Anterior infarct (cited on or before 03-MAY-2024) Abnormal ECG When compared with ECG of 03-MAY-2024 17:34, No significant change was found Confirmed by JIMENA YOUNGER PRATIK ([id number]) on 5/4/2024 12:07:08 PM     EKG 12 Lead  Result Date: 5/4/2024  Sinus tachycardia Low voltage QRS, consider pulmonary disease, pericardial effusion, or normal variant Cannot rule out Anterior infarct , age undetermined Abnormal ECG No previous ECGs found in [facility] Confirmed by JIMENA YOUNGER PRATIK ([id number]) on 5/4/2024 12:06:50 PM     General:  Alert  HEENT:  Normocephalic, atraumatic  Cardiac:  Regular rate, regular rhythm  Pulmonary:  Clear to auscultation bilaterally, respirations unlabored  Gastrointestinal:  Soft, non-tender, normal bowel sounds  Musculoskeletal:  No joint swelling  Extremities:  No edema, no cyanosis, no clubbing    Assessment and Plan:     STEMI  -cardiology consulted  -s/p LAD stents x3  -cont asa, brilinta, statin, metoprolol as BP tolerates  -f/u echo - EF 35-40%, G1DD, moderate HK mid-apical naterior and mid anterolateral walls and apex  -metoprolol was held due to low BP     HTN  -cont metoprolol and monitor     Nonfasting hyperglycemia  -check a1c      DVT Mechanical Prophylaxis:   SCDs      CARDIOLOGY  Assessment:  Acute anterior ST elevation MI, initial presentation  S/p LAD stents x 3  Reduced ejection fraction with anterior wall hypokinesis        Plan:  Continue aspirin and Brilinta uninterrupted  Echo results reviewed with the  patient  Add moderate dose of statin  Change metoprolol to tartrate to metoprolol succinate  Will add Entresto prior to discharge     DATE OF DISCHARGE: 5/6/24  Vitals (last day) before discharge       Date/Time Temp Pulse Resp BP SpO2 Weight O2 Device O2 Flow Rate (L/min) [facility]    05/06/24 0807 99.8 °F (37.7 °C) 70 18 103/70 96 % -- None (Room air) -- MS    05/06/24 0540 98.7 °F (37.1 °C) 75 18 109/71 97 % 186 lb None (Room air) --     05/05/24 2020 98.8 °F (37.1 °C) 75 20 108/76 96 % -- None (Room air) -- IM    05/05/24 0827 97.6 °F (36.4 °C) 81 18 104/63 96 % -- None (Room air) -- DG    05/05/24 0400 97.6 °F (36.4 °C) 72 17 92/61 96 % -- None (Room air) -- DW    05/05/24 0200 -- 67 13 94/79 97 % -- None (Room air) -- DW    05/05/24 0000 97.3 °F (36.3 °C) 70 11 101/68 96 % -- None (Room air) -- DW

## 2024-05-08 NOTE — PROGRESS NOTES
NCM placed call to patient for TCM, LM requesting a call back to [phone] with a condition update.

## 2024-05-08 NOTE — PROGRESS NOTES
NCM placed call to patient for TCM, LM requesting a call back to [phone] with a condition update.

## 2024-05-15 ENCOUNTER — OFFICE VISIT (OUTPATIENT)
Dept: FAMILY MEDICINE CLINIC | Facility: CLINIC | Age: 74
End: 2024-05-15

## 2024-05-15 VITALS
SYSTOLIC BLOOD PRESSURE: 100 MMHG | TEMPERATURE: 98 F | OXYGEN SATURATION: 100 % | DIASTOLIC BLOOD PRESSURE: 60 MMHG | RESPIRATION RATE: 14 BRPM | WEIGHT: 191 LBS | HEIGHT: 72 IN | HEART RATE: 65 BPM | BODY MASS INDEX: 25.87 KG/M2

## 2024-05-15 DIAGNOSIS — E78.00 ELEVATED CHOLESTEROL: ICD-10-CM

## 2024-05-15 DIAGNOSIS — I25.10 CORONARY ARTERY DISEASE INVOLVING NATIVE CORONARY ARTERY OF NATIVE HEART, UNSPECIFIED WHETHER ANGINA PRESENT: Primary | ICD-10-CM

## 2024-05-15 PROCEDURE — 1159F MED LIST DOCD IN RCRD: CPT | Performed by: FAMILY MEDICINE

## 2024-05-15 PROCEDURE — 3008F BODY MASS INDEX DOCD: CPT | Performed by: FAMILY MEDICINE

## 2024-05-15 PROCEDURE — 1170F FXNL STATUS ASSESSED: CPT | Performed by: FAMILY MEDICINE

## 2024-05-15 PROCEDURE — 99495 TRANSJ CARE MGMT MOD F2F 14D: CPT | Performed by: FAMILY MEDICINE

## 2024-05-15 PROCEDURE — 3078F DIAST BP <80 MM HG: CPT | Performed by: FAMILY MEDICINE

## 2024-05-15 PROCEDURE — 1160F RVW MEDS BY RX/DR IN RCRD: CPT | Performed by: FAMILY MEDICINE

## 2024-05-15 PROCEDURE — 1111F DSCHRG MED/CURRENT MED MERGE: CPT | Performed by: FAMILY MEDICINE

## 2024-05-15 PROCEDURE — 3074F SYST BP LT 130 MM HG: CPT | Performed by: FAMILY MEDICINE

## 2024-05-15 NOTE — PROGRESS NOTES
Multiple attempts to reach pt and messages left with no return call.  Patient went in for HFU appt with PCP on 5/15/24.  Encounter closing.

## 2024-05-15 NOTE — PROGRESS NOTES
Subjective:   Harry Parra is a 74 year old male who presents for hospital follow up.   He was discharged from [facility] to Home or Self Care  Admission Date: 5/3/24   Discharge Date: 5/6/24  Hospital Discharge Diagnosis: MI    Interactive contact within 2 business days post discharge first initiated on Date: 5/7/2024    During the visit, the following was completed:  Obtained and reviewed discharge summary, continuity of care documents, and Hospitalist notes  Reviewed Labs (CBC, CMP)    HPI: Patient is here for follow up from recent hospitalization on ..... The patient was admitted for chest pain. Pt had angiogram and stent placement done. See discharge note for details of admission.  The patient states symptoms have resolved. No chest pains or sig shortness of breath. Has cardiac rehab and follow up with cardiology scheduled. Pt would like to check carotid studies as hx of some headaches in past.   Pt is taking medications without problems.       History/Other:   Current Medications:  Medication Reconciliation:  I am aware of an inpatient discharge within the last 30 days.  The discharge medication list has been reconciled with the patient's current medication list and reviewed by me.  See medication list for additions of new medication, and changes to current doses of medications and discontinued medications.  Outpatient Medications Marked as Taking for the 5/15/24 encounter (Office Visit) with Vargas Tony MD   Medication Sig    aspirin 81 MG Oral Tab EC Take 1 tablet (81 mg total) by mouth daily.    metoprolol succinate ER 50 MG Oral Tablet 24 Hr Take 1 tablet (50 mg total) by mouth daily.    atorvastatin 40 MG Oral Tab Take 1 tablet (40 mg total) by mouth nightly.    ticagrelor 90 MG Oral Tab Take 1 tablet (90 mg total) by mouth every 12 (twelve) hours.    Ascorbic Acid (VITAMIN C OR) Take by mouth.    Ergocalciferol (VITAMIN D OR) Take by mouth.    ZINC OR Use as directed in the mouth or throat.     Omega-3 Fatty Acids (FISH OIL OR) Take by mouth.       Review of Systems   Constitutional: Negative.  Negative for fever.   HENT: Negative.     Eyes: Negative.    Respiratory: Negative.  Negative for cough and shortness of breath.    Cardiovascular: Negative.  Negative for chest pain.   Gastrointestinal: Negative.  Negative for abdominal pain.   Endocrine: Negative.    Genitourinary: Negative.  Negative for difficulty urinating and dysuria.   Musculoskeletal: Negative.    Skin: Negative.    Allergic/Immunologic: Negative.    Neurological: Negative.  Negative for headaches.   Hematological: Negative.  Does not bruise/bleed easily.   Psychiatric/Behavioral: Negative.  Negative for dysphoric mood. The patient is not nervous/anxious and is not hyperactive.          Objective:   Physical Exam  Constitutional:       Appearance: Normal appearance.   Cardiovascular:      Rate and Rhythm: Normal rate and regular rhythm.      Pulses: Normal pulses.      Heart sounds: Normal heart sounds.   Pulmonary:      Effort: Pulmonary effort is normal.      Breath sounds: Normal breath sounds.   Neurological:      General: No focal deficit present.      Mental Status: He is alert and oriented to person, place, and time.   Psychiatric:         Mood and Affect: Mood normal.         Behavior: Behavior normal.         /60   Pulse 65   Temp 97.9 °F (36.6 °C) (Temporal)   Resp 14   Ht 6' (1.829 m)   Wt 191 lb (86.6 kg)   SpO2 100%   BMI 25.90 kg/m²  Estimated body mass index is 25.9 kg/m² as calculated from the following:    Height as of this encounter: 6' (1.829 m).    Weight as of this encounter: 191 lb (86.6 kg).    Assessment & Plan:   1. Coronary artery disease involving native coronary artery of native heart, unspecified whether angina present: no symptoms and reviewed discharge summary:  - Stable, continue present management and follow up with cardiology for cardiac rehab. Will check carotid ultrasound as discussed; Follow  up and further management after testing    -     US CAROTID DOPPLER BILAT - DIAG IMG (CPT=93880); Future; Expected date: 05/15/2024    2. Elevated cholesterol:  - Stable: medication reviewed; To continue present treatment; To call if problems; Routine follow up in 6-12 months. Discussed good diet and activity.    -     US CAROTID DOPPLER BILAT - DIAG [facility] (CPT=93880); Future; Expected date: 05/15/2024        No follow-ups on file.

## 2024-05-22 ENCOUNTER — ORDER TRANSCRIPTION (OUTPATIENT)
Dept: CARDIAC REHAB | Facility: HOSPITAL | Age: 74
End: 2024-05-22

## 2024-05-22 ENCOUNTER — CARDPULM VISIT (OUTPATIENT)
Dept: CARDIAC REHAB | Facility: HOSPITAL | Age: 74
End: 2024-05-22
Attending: INTERNAL MEDICINE
Payer: MEDICARE

## 2024-05-22 DIAGNOSIS — Z95.820 S/P ANGIOPLASTY WITH STENT: Primary | ICD-10-CM

## 2024-05-29 ENCOUNTER — CARDPULM VISIT (OUTPATIENT)
Dept: CARDIAC REHAB | Facility: HOSPITAL | Age: 74
End: 2024-05-29
Attending: INTERNAL MEDICINE
Payer: MEDICARE

## 2024-05-29 PROCEDURE — 93798 PHYS/QHP OP CAR RHAB W/ECG: CPT

## 2024-05-30 ENCOUNTER — MED REC SCAN ONLY (OUTPATIENT)
Dept: FAMILY MEDICINE CLINIC | Facility: CLINIC | Age: 74
End: 2024-05-30

## 2024-05-30 ENCOUNTER — APPOINTMENT (OUTPATIENT)
Dept: CARDIAC REHAB | Facility: HOSPITAL | Age: 74
End: 2024-05-30
Attending: INTERNAL MEDICINE
Payer: MEDICARE

## 2024-05-30 PROCEDURE — 93798 PHYS/QHP OP CAR RHAB W/ECG: CPT

## 2024-06-03 ENCOUNTER — CARDPULM VISIT (OUTPATIENT)
Dept: CARDIAC REHAB | Facility: HOSPITAL | Age: 74
End: 2024-06-03
Attending: INTERNAL MEDICINE
Payer: MEDICARE

## 2024-06-03 PROCEDURE — 93798 PHYS/QHP OP CAR RHAB W/ECG: CPT

## 2024-06-05 ENCOUNTER — CARDPULM VISIT (OUTPATIENT)
Dept: CARDIAC REHAB | Facility: HOSPITAL | Age: 74
End: 2024-06-05
Attending: INTERNAL MEDICINE
Payer: MEDICARE

## 2024-06-05 PROCEDURE — 93798 PHYS/QHP OP CAR RHAB W/ECG: CPT

## 2024-06-06 ENCOUNTER — CARDPULM VISIT (OUTPATIENT)
Dept: CARDIAC REHAB | Facility: HOSPITAL | Age: 74
End: 2024-06-06
Attending: INTERNAL MEDICINE
Payer: MEDICARE

## 2024-06-06 PROCEDURE — 93798 PHYS/QHP OP CAR RHAB W/ECG: CPT

## 2024-06-10 ENCOUNTER — APPOINTMENT (OUTPATIENT)
Dept: CARDIAC REHAB | Facility: HOSPITAL | Age: 74
End: 2024-06-10
Attending: INTERNAL MEDICINE
Payer: MEDICARE

## 2024-06-10 ENCOUNTER — OFFICE VISIT (OUTPATIENT)
Dept: FAMILY MEDICINE CLINIC | Facility: CLINIC | Age: 74
End: 2024-06-10

## 2024-06-10 VITALS
OXYGEN SATURATION: 100 % | WEIGHT: 188 LBS | DIASTOLIC BLOOD PRESSURE: 70 MMHG | HEART RATE: 63 BPM | SYSTOLIC BLOOD PRESSURE: 128 MMHG | RESPIRATION RATE: 22 BRPM | HEIGHT: 72 IN | BODY MASS INDEX: 25.47 KG/M2

## 2024-06-10 DIAGNOSIS — J30.89 NON-SEASONAL ALLERGIC RHINITIS, UNSPECIFIED TRIGGER: ICD-10-CM

## 2024-06-10 DIAGNOSIS — R04.2 BLOOD IN SPUTUM: ICD-10-CM

## 2024-06-10 DIAGNOSIS — R05.3 CHRONIC COUGH: Primary | ICD-10-CM

## 2024-06-10 PROCEDURE — 1160F RVW MEDS BY RX/DR IN RCRD: CPT

## 2024-06-10 PROCEDURE — 93798 PHYS/QHP OP CAR RHAB W/ECG: CPT

## 2024-06-10 PROCEDURE — 1159F MED LIST DOCD IN RCRD: CPT

## 2024-06-10 PROCEDURE — 3074F SYST BP LT 130 MM HG: CPT

## 2024-06-10 PROCEDURE — 3008F BODY MASS INDEX DOCD: CPT

## 2024-06-10 PROCEDURE — 99213 OFFICE O/P EST LOW 20 MIN: CPT

## 2024-06-10 PROCEDURE — 3078F DIAST BP <80 MM HG: CPT

## 2024-06-10 PROCEDURE — 1126F AMNT PAIN NOTED NONE PRSNT: CPT

## 2024-06-10 RX ORDER — ALBUTEROL SULFATE 90 UG/1
2 AEROSOL, METERED RESPIRATORY (INHALATION) EVERY 4 HOURS PRN
Qty: 18 G | Refills: 0 | Status: SHIPPED | OUTPATIENT
Start: 2024-06-10

## 2024-06-10 NOTE — PROGRESS NOTES
HPI:    Patient ID: Harry Parra is a 74 year old male.    HPI     Patient here in office with complaint of cough with clear mucus production/small traces of blood and chest congestion, started over 6 months ago (most noticeable when laying down).  States blood in sputum was significant two days ago, faint amount of blood now noted when coughing. Denies wheezing, shortness of breath, chest pain, or palpitations.  Hospitalized on 5/3/2024 for STEMI.  Started on ticagrelor 90 mg daily, last seen by cardiology on 5/20/24. US carotid completed on 5/22/24 and showed bifurcation atherosclerosis is noted bilaterally. Hypoechoic as well as calcific plaque is noted, however no significant plaque or intimal thickening is noted in the proximal ICA.  Cardiac echo done during hospitalization, see results below.     Conclusions:     1. Left ventricle: The cavity size was normal. Wall thickness was normal.      Systolic function was normal. The estimated ejection fraction was 35-40%,      by visual assessment. Doppler parameters are consistent with abnormal      left ventricular relaxation - grade 1 diastolic dysfunction.   2. Left ventricle: There is moderate hypokinesis of the mid-apical anterior      and mid anterolateral walls and the apex.   3. Mitral valve: There was mild regurgitation.   4. Pulmonary arteries: Systolic pressure was within the normal range.   Impressions:  No previous study from [facility] was   available for comparison.         Review of Systems   Constitutional: Negative.    HENT: Negative.     Respiratory:  Positive for cough. Negative for shortness of breath and wheezing.    Cardiovascular: Negative.  Negative for chest pain and palpitations.   Skin: Negative.    Neurological: Negative.    Psychiatric/Behavioral: Negative.              Current Outpatient Medications   Medication Sig Dispense Refill    albuterol 108 (90 Base) MCG/ACT Inhalation Aero Soln Inhale 2 puffs into the lungs every  4 (four) hours as needed. 18 g 0    aspirin 81 MG Oral Tab EC Take 1 tablet (81 mg total) by mouth daily. 30 tablet 11    metoprolol succinate ER 50 MG Oral Tablet 24 Hr Take 1 tablet (50 mg total) by mouth daily. 30 tablet 5    atorvastatin 40 MG Oral Tab Take 1 tablet (40 mg total) by mouth nightly. 30 tablet 11    ticagrelor 90 MG Oral Tab Take 1 tablet (90 mg total) by mouth every 12 (twelve) hours. 60 tablet 11    Ascorbic Acid (VITAMIN C OR) Take by mouth.      Ergocalciferol (VITAMIN D OR) Take by mouth.      ZINC OR Use as directed in the mouth or throat.      Omega-3 Fatty Acids (FISH OIL OR) Take by mouth.       Allergies:No Known Allergies   /70 (BP Location: Right arm, Patient Position: Sitting, Cuff Size: adult)   Pulse 63   Resp 22   Ht 6' (1.829 m)   Wt 188 lb (85.3 kg)   SpO2 100%   BMI 25.50 kg/m²   Body mass index is 25.5 kg/m².  PHYSICAL EXAM:   Physical Exam  Vitals reviewed.   Constitutional:       General: He is not in acute distress.     Appearance: Normal appearance. He is not ill-appearing.   HENT:      Right Ear: Tympanic membrane, ear canal and external ear normal. There is no impacted cerumen.      Left Ear: Tympanic membrane, ear canal and external ear normal. There is no impacted cerumen.      Ears:      Comments: Bilateral ear effusion, no erythema      Nose: Rhinorrhea present.      Mouth/Throat:      Mouth: Mucous membranes are moist.      Pharynx: No oropharyngeal exudate or posterior oropharyngeal erythema.   Eyes:      General:         Right eye: No discharge.         Left eye: No discharge.      Conjunctiva/sclera: Conjunctivae normal.   Cardiovascular:      Rate and Rhythm: Normal rate and regular rhythm.      Heart sounds: Normal heart sounds. No murmur heard.     No friction rub. No gallop.   Pulmonary:      Effort: Pulmonary effort is normal. No respiratory distress.      Breath sounds: Normal breath sounds. No stridor. No wheezing, rhonchi or rales.   Chest:       Chest wall: No tenderness.   Musculoskeletal:      Cervical back: Neck supple.   Lymphadenopathy:      Cervical: No cervical adenopathy.   Skin:     General: Skin is warm.      Findings: No rash.   Neurological:      General: No focal deficit present.      Mental Status: He is alert and oriented to person, place, and time.   Psychiatric:         Mood and Affect: Mood normal.         Behavior: Behavior normal.         Thought Content: Thought content normal.         Judgment: Judgment normal.                ASSESSMENT/PLAN:   1. Chronic cough  - Suspect allergic rhinitis vs other  - Advised Claritin, Allegra, or Zyrtec daily over the counter  - Albuterol inhaler, 2 puffs every 4 hours as needed. Do not use if oral anti-histamine improves symptoms     2. Non-seasonal allergic rhinitis, unspecified trigger  - See above     3. Blood in sputum  - Instructed patient to call cardiology regarding blood in sputum since he is taking ticagrelor 90 MG Oral Tab, Take 1 tablet (90 mg total) by mouth every 12 (twelve) hours. Patient agreed and will contact cardiology       No orders of the defined types were placed in this encounter.      Meds This Visit:  Requested Prescriptions     Signed Prescriptions Disp Refills    albuterol 108 (90 Base) MCG/ACT Inhalation Aero Soln 18 g 0     Sig: Inhale 2 puffs into the lungs every 4 (four) hours as needed.       Imaging & Referrals:  None         ID#[id number]

## 2024-06-12 ENCOUNTER — CARDPULM VISIT (OUTPATIENT)
Dept: CARDIAC REHAB | Facility: HOSPITAL | Age: 74
End: 2024-06-12
Attending: INTERNAL MEDICINE
Payer: MEDICARE

## 2024-06-12 PROCEDURE — 93798 PHYS/QHP OP CAR RHAB W/ECG: CPT

## 2024-06-13 ENCOUNTER — APPOINTMENT (OUTPATIENT)
Dept: CARDIAC REHAB | Facility: HOSPITAL | Age: 74
End: 2024-06-13
Attending: INTERNAL MEDICINE
Payer: MEDICARE

## 2024-06-13 PROCEDURE — 93798 PHYS/QHP OP CAR RHAB W/ECG: CPT

## 2024-06-17 ENCOUNTER — CARDPULM VISIT (OUTPATIENT)
Dept: CARDIAC REHAB | Facility: HOSPITAL | Age: 74
End: 2024-06-17
Attending: INTERNAL MEDICINE
Payer: MEDICARE

## 2024-06-17 PROCEDURE — 93798 PHYS/QHP OP CAR RHAB W/ECG: CPT

## 2024-06-19 ENCOUNTER — APPOINTMENT (OUTPATIENT)
Dept: CARDIAC REHAB | Facility: HOSPITAL | Age: 74
End: 2024-06-19
Attending: INTERNAL MEDICINE
Payer: MEDICARE

## 2024-06-19 PROCEDURE — 93798 PHYS/QHP OP CAR RHAB W/ECG: CPT

## 2024-06-20 ENCOUNTER — APPOINTMENT (OUTPATIENT)
Dept: CARDIAC REHAB | Facility: HOSPITAL | Age: 74
End: 2024-06-20
Attending: INTERNAL MEDICINE
Payer: MEDICARE

## 2024-06-20 PROCEDURE — 93798 PHYS/QHP OP CAR RHAB W/ECG: CPT

## 2024-06-24 ENCOUNTER — APPOINTMENT (OUTPATIENT)
Dept: CARDIAC REHAB | Facility: HOSPITAL | Age: 74
End: 2024-06-24
Attending: INTERNAL MEDICINE
Payer: MEDICARE

## 2024-06-24 PROCEDURE — 93798 PHYS/QHP OP CAR RHAB W/ECG: CPT

## 2024-06-26 ENCOUNTER — APPOINTMENT (OUTPATIENT)
Dept: CARDIAC REHAB | Facility: HOSPITAL | Age: 74
End: 2024-06-26
Attending: INTERNAL MEDICINE
Payer: MEDICARE

## 2024-06-26 PROCEDURE — 93798 PHYS/QHP OP CAR RHAB W/ECG: CPT

## 2024-06-27 ENCOUNTER — CARDPULM VISIT (OUTPATIENT)
Dept: CARDIAC REHAB | Facility: HOSPITAL | Age: 74
End: 2024-06-27
Attending: INTERNAL MEDICINE
Payer: MEDICARE

## 2024-06-27 PROCEDURE — 93798 PHYS/QHP OP CAR RHAB W/ECG: CPT

## 2024-07-01 ENCOUNTER — APPOINTMENT (OUTPATIENT)
Dept: CARDIAC REHAB | Facility: HOSPITAL | Age: 74
End: 2024-07-01
Attending: INTERNAL MEDICINE
Payer: MEDICARE

## 2024-07-01 PROCEDURE — 93798 PHYS/QHP OP CAR RHAB W/ECG: CPT

## 2024-07-03 ENCOUNTER — APPOINTMENT (OUTPATIENT)
Dept: CARDIAC REHAB | Facility: HOSPITAL | Age: 74
End: 2024-07-03
Attending: INTERNAL MEDICINE
Payer: MEDICARE

## 2024-07-04 ENCOUNTER — APPOINTMENT (OUTPATIENT)
Dept: CARDIAC REHAB | Facility: HOSPITAL | Age: 74
End: 2024-07-04
Attending: INTERNAL MEDICINE
Payer: MEDICARE

## 2024-07-08 ENCOUNTER — APPOINTMENT (OUTPATIENT)
Dept: CARDIAC REHAB | Facility: HOSPITAL | Age: 74
End: 2024-07-08
Attending: INTERNAL MEDICINE
Payer: MEDICARE

## 2024-07-08 PROCEDURE — 93798 PHYS/QHP OP CAR RHAB W/ECG: CPT

## 2024-07-10 ENCOUNTER — APPOINTMENT (OUTPATIENT)
Dept: CARDIAC REHAB | Facility: HOSPITAL | Age: 74
End: 2024-07-10
Attending: INTERNAL MEDICINE
Payer: MEDICARE

## 2024-07-10 PROCEDURE — 93798 PHYS/QHP OP CAR RHAB W/ECG: CPT

## 2024-07-11 ENCOUNTER — APPOINTMENT (OUTPATIENT)
Dept: CARDIAC REHAB | Facility: HOSPITAL | Age: 74
End: 2024-07-11
Attending: INTERNAL MEDICINE
Payer: MEDICARE

## 2024-07-11 PROCEDURE — 93798 PHYS/QHP OP CAR RHAB W/ECG: CPT

## 2024-07-15 ENCOUNTER — CARDPULM VISIT (OUTPATIENT)
Dept: CARDIAC REHAB | Facility: HOSPITAL | Age: 74
End: 2024-07-15
Attending: INTERNAL MEDICINE
Payer: MEDICARE

## 2024-07-15 PROCEDURE — 93798 PHYS/QHP OP CAR RHAB W/ECG: CPT

## 2024-07-17 ENCOUNTER — APPOINTMENT (OUTPATIENT)
Dept: CARDIAC REHAB | Facility: HOSPITAL | Age: 74
End: 2024-07-17
Attending: INTERNAL MEDICINE
Payer: MEDICARE

## 2024-07-17 PROCEDURE — 93798 PHYS/QHP OP CAR RHAB W/ECG: CPT

## 2024-07-18 ENCOUNTER — APPOINTMENT (OUTPATIENT)
Dept: CARDIAC REHAB | Facility: HOSPITAL | Age: 74
End: 2024-07-18
Attending: INTERNAL MEDICINE
Payer: MEDICARE

## 2024-07-18 PROCEDURE — 93798 PHYS/QHP OP CAR RHAB W/ECG: CPT

## 2024-07-22 ENCOUNTER — CARDPULM VISIT (OUTPATIENT)
Dept: CARDIAC REHAB | Facility: HOSPITAL | Age: 74
End: 2024-07-22
Attending: INTERNAL MEDICINE
Payer: MEDICARE

## 2024-07-22 PROCEDURE — 93798 PHYS/QHP OP CAR RHAB W/ECG: CPT

## 2024-07-24 ENCOUNTER — CARDPULM VISIT (OUTPATIENT)
Dept: CARDIAC REHAB | Facility: HOSPITAL | Age: 74
End: 2024-07-24
Attending: INTERNAL MEDICINE
Payer: MEDICARE

## 2024-07-24 PROCEDURE — 93798 PHYS/QHP OP CAR RHAB W/ECG: CPT

## 2024-07-25 ENCOUNTER — APPOINTMENT (OUTPATIENT)
Dept: CARDIAC REHAB | Facility: HOSPITAL | Age: 74
End: 2024-07-25
Attending: INTERNAL MEDICINE
Payer: MEDICARE

## 2024-07-25 PROCEDURE — 93798 PHYS/QHP OP CAR RHAB W/ECG: CPT

## 2024-07-29 ENCOUNTER — CARDPULM VISIT (OUTPATIENT)
Dept: CARDIAC REHAB | Facility: HOSPITAL | Age: 74
End: 2024-07-29
Attending: INTERNAL MEDICINE
Payer: MEDICARE

## 2024-07-29 PROCEDURE — 93798 PHYS/QHP OP CAR RHAB W/ECG: CPT

## 2024-07-31 ENCOUNTER — APPOINTMENT (OUTPATIENT)
Dept: CARDIAC REHAB | Facility: HOSPITAL | Age: 74
End: 2024-07-31
Attending: INTERNAL MEDICINE
Payer: MEDICARE

## 2024-07-31 PROCEDURE — 93798 PHYS/QHP OP CAR RHAB W/ECG: CPT

## 2024-08-01 ENCOUNTER — CARDPULM VISIT (OUTPATIENT)
Dept: CARDIAC REHAB | Facility: HOSPITAL | Age: 74
End: 2024-08-01
Attending: INTERNAL MEDICINE
Payer: MEDICARE

## 2024-08-01 PROCEDURE — 93798 PHYS/QHP OP CAR RHAB W/ECG: CPT

## 2024-08-05 ENCOUNTER — CARDPULM VISIT (OUTPATIENT)
Dept: CARDIAC REHAB | Facility: HOSPITAL | Age: 74
End: 2024-08-05
Attending: INTERNAL MEDICINE
Payer: MEDICARE

## 2024-08-05 PROCEDURE — 93798 PHYS/QHP OP CAR RHAB W/ECG: CPT

## 2024-08-07 ENCOUNTER — APPOINTMENT (OUTPATIENT)
Dept: CARDIAC REHAB | Facility: HOSPITAL | Age: 74
End: 2024-08-07
Attending: INTERNAL MEDICINE
Payer: MEDICARE

## 2024-08-07 PROCEDURE — 93798 PHYS/QHP OP CAR RHAB W/ECG: CPT

## 2024-08-08 ENCOUNTER — APPOINTMENT (OUTPATIENT)
Dept: CARDIAC REHAB | Facility: HOSPITAL | Age: 74
End: 2024-08-08
Attending: INTERNAL MEDICINE
Payer: MEDICARE

## 2024-08-08 PROCEDURE — 93798 PHYS/QHP OP CAR RHAB W/ECG: CPT

## 2024-08-12 ENCOUNTER — CARDPULM VISIT (OUTPATIENT)
Dept: CARDIAC REHAB | Facility: HOSPITAL | Age: 74
End: 2024-08-12
Attending: INTERNAL MEDICINE
Payer: MEDICARE

## 2024-08-12 PROCEDURE — 93798 PHYS/QHP OP CAR RHAB W/ECG: CPT

## 2024-08-14 ENCOUNTER — APPOINTMENT (OUTPATIENT)
Dept: CARDIAC REHAB | Facility: HOSPITAL | Age: 74
End: 2024-08-14
Attending: INTERNAL MEDICINE
Payer: MEDICARE

## 2024-08-14 PROCEDURE — 93798 PHYS/QHP OP CAR RHAB W/ECG: CPT

## 2024-08-15 ENCOUNTER — APPOINTMENT (OUTPATIENT)
Dept: CARDIAC REHAB | Facility: HOSPITAL | Age: 74
End: 2024-08-15
Attending: INTERNAL MEDICINE
Payer: MEDICARE

## 2024-08-15 PROCEDURE — 93798 PHYS/QHP OP CAR RHAB W/ECG: CPT

## 2024-08-19 ENCOUNTER — APPOINTMENT (OUTPATIENT)
Dept: CARDIAC REHAB | Facility: HOSPITAL | Age: 74
End: 2024-08-19
Attending: INTERNAL MEDICINE
Payer: MEDICARE

## 2024-08-19 PROCEDURE — 93798 PHYS/QHP OP CAR RHAB W/ECG: CPT

## 2024-08-21 ENCOUNTER — CARDPULM VISIT (OUTPATIENT)
Dept: CARDIAC REHAB | Facility: HOSPITAL | Age: 74
End: 2024-08-21
Attending: INTERNAL MEDICINE
Payer: MEDICARE

## 2024-08-21 PROCEDURE — 93798 PHYS/QHP OP CAR RHAB W/ECG: CPT

## 2024-08-22 ENCOUNTER — CARDPULM VISIT (OUTPATIENT)
Dept: CARDIAC REHAB | Facility: HOSPITAL | Age: 74
End: 2024-08-22
Attending: INTERNAL MEDICINE
Payer: MEDICARE

## 2024-08-22 PROCEDURE — 93798 PHYS/QHP OP CAR RHAB W/ECG: CPT

## 2024-08-26 ENCOUNTER — APPOINTMENT (OUTPATIENT)
Dept: CARDIAC REHAB | Facility: HOSPITAL | Age: 74
End: 2024-08-26
Attending: INTERNAL MEDICINE
Payer: MEDICARE

## 2024-08-26 ENCOUNTER — MED REC SCAN ONLY (OUTPATIENT)
Dept: FAMILY MEDICINE CLINIC | Facility: CLINIC | Age: 74
End: 2024-08-26

## 2024-08-28 ENCOUNTER — APPOINTMENT (OUTPATIENT)
Dept: CARDIAC REHAB | Facility: HOSPITAL | Age: 74
End: 2024-08-28
Attending: INTERNAL MEDICINE
Payer: MEDICARE

## 2024-08-28 ENCOUNTER — LAB ENCOUNTER (OUTPATIENT)
Dept: LAB | Facility: HOSPITAL | Age: 74
End: 2024-08-28
Attending: INTERNAL MEDICINE
Payer: MEDICARE

## 2024-08-28 DIAGNOSIS — E78.2 MIXED HYPERLIPIDEMIA: Primary | ICD-10-CM

## 2024-08-28 LAB
CHOLEST SERPL-MCNC: 138 MG/DL (ref ?–200)
FASTING PATIENT LIPID ANSWER: YES
HDLC SERPL-MCNC: 70 MG/DL (ref 40–59)
LDLC SERPL CALC-MCNC: 54 MG/DL (ref ?–100)
NONHDLC SERPL-MCNC: 68 MG/DL (ref ?–130)
TRIGL SERPL-MCNC: 71 MG/DL (ref 30–149)
VLDLC SERPL CALC-MCNC: 10 MG/DL (ref 0–30)

## 2024-08-28 PROCEDURE — 80061 LIPID PANEL: CPT

## 2024-08-28 PROCEDURE — 36415 COLL VENOUS BLD VENIPUNCTURE: CPT

## 2024-08-29 ENCOUNTER — APPOINTMENT (OUTPATIENT)
Dept: CARDIAC REHAB | Facility: HOSPITAL | Age: 74
End: 2024-08-29
Attending: INTERNAL MEDICINE
Payer: MEDICARE

## 2024-09-02 ENCOUNTER — APPOINTMENT (OUTPATIENT)
Dept: CARDIAC REHAB | Facility: HOSPITAL | Age: 74
End: 2024-09-02
Attending: INTERNAL MEDICINE
Payer: MEDICARE

## 2024-09-04 ENCOUNTER — APPOINTMENT (OUTPATIENT)
Dept: CARDIAC REHAB | Facility: HOSPITAL | Age: 74
End: 2024-09-04
Attending: INTERNAL MEDICINE
Payer: MEDICARE

## 2024-09-05 ENCOUNTER — APPOINTMENT (OUTPATIENT)
Dept: CARDIAC REHAB | Facility: HOSPITAL | Age: 74
End: 2024-09-05
Attending: INTERNAL MEDICINE
Payer: MEDICARE

## 2024-09-09 ENCOUNTER — APPOINTMENT (OUTPATIENT)
Dept: CARDIAC REHAB | Facility: HOSPITAL | Age: 74
End: 2024-09-09
Attending: INTERNAL MEDICINE
Payer: MEDICARE

## 2024-09-11 ENCOUNTER — APPOINTMENT (OUTPATIENT)
Dept: CARDIAC REHAB | Facility: HOSPITAL | Age: 74
End: 2024-09-11
Attending: INTERNAL MEDICINE
Payer: MEDICARE

## 2025-01-10 ENCOUNTER — TELEPHONE (OUTPATIENT)
Facility: CLINIC | Age: 75
End: 2025-01-10

## 2025-01-10 DIAGNOSIS — Z12.11 SPECIAL SCREENING FOR MALIGNANT NEOPLASMS, COLON: Primary | ICD-10-CM

## 2025-01-10 DIAGNOSIS — Z86.0101 H/O ADENOMATOUS POLYP OF COLON: ICD-10-CM

## 2025-01-10 NOTE — TELEPHONE ENCOUNTER
Left message to call back to colon recall questions.  If returning call, ok to transfer to RN.

## 2025-01-10 NOTE — TELEPHONE ENCOUNTER
Patient calling again   Informed turn around time for call back can be up to 3 business day.  Please call

## 2025-01-10 NOTE — TELEPHONE ENCOUNTER
Colonoscopy Report           Harry Parra      1950 Age 72 year old   PCP Vargas Tony MD Endoscopist Isiah Houston MD      Date of procedure: 22     Procedure: Colonoscopy w/ biopsy     Pre-operative diagnosis: colorectal cancer screening, history of adenomatous colon polyp     Post-operative diagnosis: colon polyp, hemorrhoids     Sedation: monitored anesthesia care (MAC)     Consent: We discussed the risks/benefits and alternatives to this procedure, as well as the planned sedation. Informed consent was obtained from the patient after the risks of the procedure were discussed, including but not limited to bleeding, perforation, aspiration, infection, or possibility of a missed lesion as well as the risks of anesthesia including but not limited to cardiopulmonary complications. The patient signed informed consent and elected to proceed with Colonoscopy with intervention [i.e. Biopsy, control of bleeding, dilatation, polypectomy, endoscopic mucosal resection, etc.] as indicated.     Colonoscopy procedure: Once an adequate level of sedation was obtained a digital rectal exam was completed revealing normal tone and no masses palpated. Then the lubricated tip of the Cxobuyn-UXNUE-751 diagnostic video colonoscope was carefully inserted and advanced without difficulty to the cecum using the air insufflation technique (only Co2 was used for insufflation). The cecum was identified by localizing the trifold, the appendix and the ileocecal valve. Withdrawal was begun with thorough washing and careful examination of the colonic walls and folds. The ascending colon was viewed twice in the forward view. Photodocumentation was obtained. The bowel prep was good. Views of the colon were good with washing. Withdrawal time was 16 minutes.     Air was then withdrawn and the endoscope was removed. The patient tolerated the procedure well. There were no immediate postoperative complications. The patient’s vital  signs were monitored throughout the procedure and remained stable.     Estimated blood loss: insignificant     Specimens collected:  Colon polyps     Complications: none      Colonoscopy findings:  Terminal ileum: normal mucosa     Cecum: normal mucosa and vascular pattern     Ascending colon: there was a 2-3 mm polyp removed by cold biopsy forceps at the hepatic flexure. Otherwise, normal mucosa and vascular pattern     Transverse colon: there was a 2-3 mm polyp removed by cold biopsy forceps. Otherwise, normal mucosa and vascular pattern     Descending colon: normal mucosa and vascular pattern     Sigmoid colon: there was a 3 mm and 3-4 mm polyp removed by cold biopsy forceps. Otherwise, normal mucosa and vascular pattern     Rectum: retroflexed view showed small non-bleeding hemorrhoids. Otherwise, normal mucosa and vascular pattern.     Impression:  1. 4 diminutive colon polyps removed  2. Small non-bleeding hemorrhoids  3. Otherwise, unremarkable colonoscopy     Recommend:  1. Await pathology.   2. Repeat colonoscopy interval pending final pathology results. If new signs or symptoms develop, procedure may need to be repeated sooner.   3. Continue your current medications  4. Follow up with your primary care physician on a routine basis     >>>If biopsies were performed and you have not received your pathology results either by phone or letter within 2 weeks, please call our office at [phone].     Isiah Houston MD  [facility] - Gastroenterology  3/14/2022     Final Diagnosis:      A. Hepatic flexure colon polyp x1:  Tubular adenoma.     B. Transverse colon polyp x1:  Tubular adenoma.     C. Sigmoid colon polyp x2:  Fragments of sessile serrated adenoma.

## 2025-01-10 NOTE — TELEPHONE ENCOUNTER
Fax request sent to Dr. Zaidi asking if patient is cleared to schedule colonoscopy now or has to wait until 1 year from stent placement (May 2025)

## 2025-01-10 NOTE — TELEPHONE ENCOUNTER
Colon screening.  Please advise on colonoscopy and bowel prep orders.   Medications, pharmacy, and allergies reviewed.     › H/W/BMI: 6'/184lbs/25.0    Special comments/notes:  Recall    Last Procedure, Date, MD:   Colonoscopy, 3/2022, MG   Last diagnosis: Serrated adenoma   Recalled for (mth/yrs): 3 years   Sedation used previously: MAC   Last Prep Used: Trilyte    Quality of Prep: good     Telephone Colon Screening Questionnaire Yes No   Are you currently experiencing any GI symptoms [] [x]   If yes, explain:     Rectal bleeding [] [x]   Black stool [] [x]   Dysphagia or food \"feeling stuck\" when eating [] [x]   Intractable vomiting [] [x]   Unexplained weight loss [] [x]   First colonoscopy [] [x]   Family history of colon cancer [] [x]   Any issues with anesthesia [] [x]   If yes, explain:      Any recent complaints related to chest pain &/or shortness of breath [] [x]   Referred to a cardiologist?  [] [x]   If yes, explain:      History of  respiratory issues/oxygen/ALLISON/COPD [] [x]   CPAP/BiPAP:     History of devices (pacemaker/defibrillator) [] [x]   History of heart attack &/or stroke: Heart attack May 2024 [x] []   If yes, in the last 12 months? Stent placement? May 2024 [x] []     Medication Reconciliation  Yes  No   Anticoagulants (except Aspirin)- Plavix - Dr. Zaidi [x] []   Diabetic Medication [] [x]   Weight loss medication (phentermine/vyvanse/saxsenda/etc) [] [x]   Iron/herbal/multivitamin supplement(s) [] [x]   Usage of marijuana, CBD &/or vape product(s) [] [x]

## 2025-01-28 NOTE — TELEPHONE ENCOUNTER
GI office received fax from [facility]. \"Ok to hold plavix x 5 days, do not hold ASA, resume plavix after colonoscopy.\" Sent to scan.

## 2025-01-28 NOTE — TELEPHONE ENCOUNTER
Patient is calling to schedule Colonoscopy.   [facility] informed him that the clearance was sent to office.  Please call

## 2025-01-28 NOTE — TELEPHONE ENCOUNTER
Dr. Houston,    Patient given cardiac clearance and orders to hold Plavix 5 days prior to procedure.    Please provide orders if appropriate, thank you.

## 2025-01-28 NOTE — TELEPHONE ENCOUNTER
Ok to schedule colonoscopy with MAC with split colyte for colorectal cancer screening and history of adenomatous colon polyps at [facility] or hospital on a Friday     Hold plavix 5 days prior.    Thanks    Isiah Houston MD  [facility] - Gastroenterology

## 2025-01-29 NOTE — TELEPHONE ENCOUNTER
Patient is returning the schedulers call. I tried to reach but not available at the time of the call.

## 2025-01-29 NOTE — TELEPHONE ENCOUNTER
Scheduled for:  Colonoscopy [id number]  Provider Name:  Dr. Houston   Date:  4/18/2025  Location:  [facility]  Sedation:  MAC  Time:  1:15pm, pt is aware [facility] will call with arrival time  Prep:  Colyte   Meds/Allergies Reconciled?:  Physician reviewed     Diagnosis with codes:  colorectal cancer screening Z12.11 and history of adenomatous colon polyps Z86.010  Was patient informed to call insurance with codes (Y/N):  Yes     Referral sent?:  Referral was sent at the time of electronic surgical scheduling.   [facility] or [facility] notified?:  I sent an electronic request to Endo Scheduling and received a confirmation today.      Medication Orders:  Hold Plavix for 5 days prior  Misc Orders:     n/a  Further instructions given by staff:   I discussed the prep instructions with the patient which he  verbally understood and is aware that I will mail the instructions today.

## 2025-01-29 NOTE — TELEPHONE ENCOUNTER
L[facility]-    Please send prep to patient's pharmacy, patient is scheduled on 4/18/2025.    Thanks!

## 2025-03-05 ENCOUNTER — MED REC SCAN ONLY (OUTPATIENT)
Dept: FAMILY MEDICINE CLINIC | Facility: CLINIC | Age: 75
End: 2025-03-05

## 2025-04-02 ENCOUNTER — TELEPHONE (OUTPATIENT)
Dept: FAMILY MEDICINE CLINIC | Facility: CLINIC | Age: 75
End: 2025-04-02

## 2025-04-02 RX ORDER — CLOPIDOGREL BISULFATE 75 MG
75 TABLET ORAL DAILY
COMMUNITY
Start: 2024-12-23

## 2025-04-02 NOTE — TELEPHONE ENCOUNTER
Reached patient for medication adherence consult. Patient overdue for refill on atorvastatin per insurance report.    Patient reports that he is taking the medication as prescribed. He reports tolerating the medication well. He denies the need for refills at this time. He reports that he is due to refill his medications soon and will call the pharmacy when needed.    Reconciled outside medications. Patient had clopidogrel refilled recently but also had Brilinta on his list. He reports that he is no longer taking Brilinta and is taking clopidogrel as prescribed. Medication list updated.     Provided education on importance of adherence. Patient denies any questions or concerns with medication.

## 2025-04-18 ENCOUNTER — ANESTHESIA (OUTPATIENT)
Dept: ENDOSCOPY | Facility: HOSPITAL | Age: 75
End: 2025-04-18
Payer: MEDICARE

## 2025-04-18 ENCOUNTER — ANESTHESIA EVENT (OUTPATIENT)
Dept: ENDOSCOPY | Facility: HOSPITAL | Age: 75
End: 2025-04-18
Payer: MEDICARE

## 2025-04-18 ENCOUNTER — HOSPITAL ENCOUNTER (OUTPATIENT)
Facility: HOSPITAL | Age: 75
Setting detail: HOSPITAL OUTPATIENT SURGERY
Discharge: HOME | End: 2025-04-18
Attending: INTERNAL MEDICINE | Admitting: INTERNAL MEDICINE
Payer: MEDICARE

## 2025-04-18 VITALS
DIASTOLIC BLOOD PRESSURE: 81 MMHG | BODY MASS INDEX: 25.73 KG/M2 | HEART RATE: 70 BPM | WEIGHT: 190 LBS | TEMPERATURE: 98 F | HEIGHT: 72 IN | RESPIRATION RATE: 15 BRPM | SYSTOLIC BLOOD PRESSURE: 142 MMHG | OXYGEN SATURATION: 95 %

## 2025-04-18 DIAGNOSIS — Z86.0101 H/O ADENOMATOUS POLYP OF COLON: ICD-10-CM

## 2025-04-18 DIAGNOSIS — Z12.11 SPECIAL SCREENING FOR MALIGNANT NEOPLASMS, COLON: ICD-10-CM

## 2025-04-18 PROCEDURE — 45380 COLONOSCOPY AND BIOPSY: CPT | Performed by: INTERNAL MEDICINE

## 2025-04-18 PROCEDURE — 0DBN8ZX EXCISION OF SIGMOID COLON, VIA NATURAL OR ARTIFICIAL OPENING ENDOSCOPIC, DIAGNOSTIC: ICD-10-PCS | Performed by: INTERNAL MEDICINE

## 2025-04-18 PROCEDURE — 0DBP8ZX EXCISION OF RECTUM, VIA NATURAL OR ARTIFICIAL OPENING ENDOSCOPIC, DIAGNOSTIC: ICD-10-PCS | Performed by: INTERNAL MEDICINE

## 2025-04-18 PROCEDURE — 0DBM8ZX EXCISION OF DESCENDING COLON, VIA NATURAL OR ARTIFICIAL OPENING ENDOSCOPIC, DIAGNOSTIC: ICD-10-PCS | Performed by: INTERNAL MEDICINE

## 2025-04-18 PROCEDURE — 45385 COLONOSCOPY W/LESION REMOVAL: CPT | Performed by: INTERNAL MEDICINE

## 2025-04-18 RX ORDER — LIDOCAINE HYDROCHLORIDE 10 MG/ML
INJECTION, SOLUTION EPIDURAL; INFILTRATION; INTRACAUDAL; PERINEURAL AS NEEDED
Status: DISCONTINUED | OUTPATIENT
Start: 2025-04-18 | End: 2025-04-18 | Stop reason: SURG

## 2025-04-18 RX ORDER — SODIUM CHLORIDE, SODIUM LACTATE, POTASSIUM CHLORIDE, CALCIUM CHLORIDE 600; 310; 30; 20 MG/100ML; MG/100ML; MG/100ML; MG/100ML
INJECTION, SOLUTION INTRAVENOUS CONTINUOUS
Status: DISCONTINUED | OUTPATIENT
Start: 2025-04-18 | End: 2025-04-18

## 2025-04-18 RX ORDER — NALOXONE HYDROCHLORIDE 0.4 MG/ML
0.08 INJECTION, SOLUTION INTRAMUSCULAR; INTRAVENOUS; SUBCUTANEOUS ONCE AS NEEDED
Status: DISCONTINUED | OUTPATIENT
Start: 2025-04-18 | End: 2025-04-18

## 2025-04-18 RX ADMIN — LIDOCAINE HYDROCHLORIDE 25 MG: 10 INJECTION, SOLUTION EPIDURAL; INFILTRATION; INTRACAUDAL; PERINEURAL at 11:18:00

## 2025-04-18 NOTE — OPERATIVE REPORT
Colonoscopy Report    Harry Parra     1950 Age 75 year old   PCP Vargas Tony MD Endoscopist Isiah Houston MD     Date of procedure: 25    Procedure: Colonoscopy w/ biopsy and snare polypectomy    Pre-operative diagnosis: colorectal cancer screening, history of adenomatous colon polyps     Last colonoscopy 2022 with 4 sub-centimeter adenomatous colon polyps removed    Post-operative diagnosis: see impression    Sedation: monitored anesthesia care (MAC)    Consent: We discussed the risks/benefits and alternatives to this procedure, as well as the planned sedation. Informed consent was obtained from the patient after the risks of the procedure were discussed, including but not limited to bleeding, perforation, aspiration, infection, or possibility of a missed lesion as well as the risks of anesthesia including but not limited to cardiopulmonary complications. The patient signed informed consent and elected to proceed with Colonoscopy with intervention [i.e. Biopsy, control of bleeding, dilatation, polypectomy, endoscopic mucosal resection, etc.] as indicated.    Colonoscopy procedure: Once an adequate level of sedation was obtained a digital rectal exam was completed revealing normal tone and no masses palpated. Then the lubricated tip of the Cbxezmd-VIGTS-015 diagnostic video colonoscope was carefully inserted and advanced without difficulty to the cecum using the air insufflation technique (only Co2 was used for insufflation). The cecum was identified by localizing the trifold, the appendix and the ileocecal valve. Withdrawal was begun with thorough washing and careful examination of the colonic walls and folds. The ascending colon was viewed twice in the forward view. Photodocumentation was obtained. The bowel prep was good. Views of the colon were good with washing. Withdrawal time was 11 minutes.    Air was then withdrawn and the endoscope was removed. The patient tolerated the  procedure well. There were no immediate postoperative complications. The patient’s vital signs were monitored throughout the procedure and remained stable.    Estimated blood loss: insignificant    Specimens collected:  colon polyps, rectal polyps    Complications: none     Colonoscopy findings:    Cecum: normal mucosa and vascular pattern    Ascending colon: normal mucosa and vascular pattern    Transverse colon: normal mucosa and vascular pattern    Descending colon: There was a 6 mm polyp removed by cold snare polypectomy.  Otherwise, normal mucosa and vascular pattern    Sigmoid colon: There was a 7 mm polyp removed by cold snare polypectomy.  Otherwise, normal mucosa and vascular pattern    Rectum: retroflexed view showed small non-bleeding hemorrhoids.  There were 2 polyps each measuring 3 mm and both removed by cold biopsy forceps. Otherwise, normal mucosa and vascular pattern.    Impression:  1. 4 polyps removed  2. Hemorrhoids  3. Otherwise, unremarkable colonoscopy    Recommend:  1. Await pathology.   2. Repeat colonoscopy interval pending final pathology results. If new signs or symptoms develop, procedure may need to be repeated sooner.   3. Continue your current medications including resumption of clopidogrel in 24 hours  4. Follow up with your primary care physician on a routine basis    >>>If biopsies were performed and you have not received your pathology results either by phone or letter within 2 weeks, please call our office at [phone].    [facility] - Gastroenterology  4/18/2025

## 2025-04-18 NOTE — DISCHARGE INSTRUCTIONS
Home Care Instructions for Colonoscopy with Sedation    Diet:  - Resume your regular diet as tolerated unless otherwise instructed.  - Start with light meals to minimize bloating.  - Do not drink alcohol today.    Medication:  - If you have questions about resuming your normal medications, please contact your Primary Care Physician.+  - Resume Aspirin Today per   - Resume Plavix in 24 hours per     Activities:  - Take it easy today. Do not return to work today.  - Do not drive today.  - Do not operate any machinery today (including kitchen equipment).  -   Do not make any critical decisions or sign any paperwork.  - Do not exercise today.    Colonoscopy:  - You may notice some rectal \"spotting\" (a little blood on the toilet tissue) for a day or two after the exam. This is normal.  - If you experience any rectal bleeding (not spotting), persistent tenderness or sharp severe abdominal pains, oral temperature over 100 degrees Fahrenheit, light-headedness or dizziness, or any other problems, contact your doctor.      **If unable to reach your doctor, please go to the [facility] Emergency Room**    - Your referring physician will receive a full report of your examination.  - If you do not hear from your doctor's office within two weeks of your biopsy, please call them for your results.    You may be able to see your laboratory results in [employer] between 4 and 7 business days.  In some cases, your physician may not have viewed the results before they are released to [employer].  If you have questions regarding your results contact the physician who ordered the test/exam by phone or via [employer] by choosing \"Ask a Medical Question.\"

## 2025-04-18 NOTE — ANESTHESIA POSTPROCEDURE EVALUATION
Patient: Harry Parra    Procedure Summary       Date: 04/18/25 Room / Location: [facility] ENDOSCOPY 01 / [facility] ENDOSCOPY    Anesthesia Start: 1115 Anesthesia Stop: 1139    Procedure: COLONOSCOPY Diagnosis:       Special screening for malignant neoplasms, colon      H/O adenomatous polyp of colon      (polyps,hemorrhoids)    Surgeons: Isiah Houston MD Anesthesiologist: Jazzy Olea CRNA    Anesthesia Type: MAC ASA Status: 3            Anesthesia Type: MAC    Vitals Value Taken Time   /70 04/18/25 11:40   Temp 98 °F (36.7 °C) 04/18/25 11:39   Pulse 92 04/18/25 11:41   Resp 21 04/18/25 11:41   SpO2 96 % 04/18/25 11:41   Vitals shown include unfiled device data.    [facility] AN Post Evaluation:   Patient Evaluated in PACU  Patient Participation: complete - patient participated  Level of Consciousness: awake and alert  Pain Score: 0  Pain Management: adequate  Airway Patency:patent  Dental exam unchanged from preop  Yes    Nausea/Vomiting: none  Cardiovascular Status: blood pressure returned to baseline, hemodynamically stable and stable  Respiratory Status: acceptable  Postoperative Hydration acceptable      Jazzy Olea CRNA  4/18/2025 11:41 AM

## 2025-04-18 NOTE — ANESTHESIA PREPROCEDURE EVALUATION
Anesthesia PreOp Note    HPI:     Harry Parra is a 75 year old male who presents for preoperative consultation requested by: Isiah Houston MD    Date of Surgery: 4/18/2025    Procedure(s):  COLONOSCOPY  Indication: Special screening for malignant neoplasms, colon / H/O adenomatous polyp of colon    Relevant Problems   No relevant active problems       NPO:                         History Review:  Patient Active Problem List    Diagnosis Date Noted    ST elevation myocardial infarction (STEMI), unspecified artery (HCC) 05/03/2024       Past Medical History[1]    Past Surgical History[2]    Prescriptions Prior to Admission[3]  Current Medications and Prescriptions Ordered in Epic[4]    Allergies[5]    Family History[6]  Social Hx on file[7]    Available pre-op labs reviewed.             Vital Signs:  Body mass index is 25.77 kg/m².   height is 1.829 m (6') and weight is 86.2 kg (190 lb).   Vitals:    04/10/25 1434   Weight: 86.2 kg (190 lb)   Height: 1.829 m (6')        Anesthesia Evaluation      No history of anesthetic complications   Airway   Mallampati: II  TM distance: >3 FB  Neck ROM: full  Dental      Comment: Poor dentition    Pulmonary - negative ROS and normal exam    breath sounds clear to auscultation  Cardiovascular - normal exam  Exercise tolerance: good  (+) past MI, CABG/stent    ECG reviewed  ROS comment: Reviewed ECHO  Cardiac stents 2024    Neuro/Psych      Comments: ETOH beer daily    GI/Hepatic/Renal    (+) bowel prep    Endo/Other - negative ROS   Abdominal                  Anesthesia Plan:   ASA:  3  Plan:   MAC  Informed Consent Plan and Risks Discussed With:  Patient  Discussed plan with:  Surgeon      I have informed Harry Parra and/or legal guardian or family member of the nature of the anesthetic plan, benefits, risks including possible dental damage if relevant, major complications, and any alternative forms of anesthetic management.   All of the patient's questions were answered  to the best of my ability. The patient desires the anesthetic management as planned.  Jazzy Olea CRNA  2025 10:18 AM  Present on Admission:  **None**           [1]   Past Medical History:   Basal cell carcinoma    skin of right nasal sidewall    Colon adenomas    x4    Heart attack (HCC)    High cholesterol    Retinal hemorrhage    left eye    Visual impairment    glasses   [2]   Past Surgical History:  Procedure Laterality Date    Cataract Left     Cath bare metal stent (bms)      x 1    Cath drug eluting stent      x 2    Colonoscopy N/A 2018    Procedure: COLONOSCOPY;  Surgeon: Gwendolyn Rodriguez MD;  Location: [facility] ENDOSCOPY    Colonoscopy N/A 2022    Procedure: COLONOSCOPY;  Surgeon: Isiah Houston MD;  Location: [facility] ENDOSCOPY    Exc skin malig 2.1-3cm face,facial Right     w/ skin graft    Inguinal hernia repair Right     Laser surgery of eye Right     Repr cmpl wnd lid,nos,ear 2.5-7.5      Retinal laser procedure Left 06/10/2021    repair retinal detachment   [3]   Medications Prior to Admission   Medication Sig Dispense Refill Last Dose/Taking    aspirin 81 MG Oral Tab EC Take 1 tablet (81 mg total) by mouth daily. 30 tablet 11 Taking    metoprolol succinate ER 50 MG Oral Tablet 24 Hr Take 1 tablet (50 mg total) by mouth daily. 30 tablet 5 Taking    atorvastatin 40 MG Oral Tab Take 1 tablet (40 mg total) by mouth nightly. 30 tablet 11 Taking    Ascorbic Acid (VITAMIN C OR) Take by mouth.   Taking    Ergocalciferol (VITAMIN D OR) Take by mouth.   Taking    ZINC OR Use as directed in the mouth or throat.   Taking    [] polyethylene glycol, PEG 3350-KCl-NaBcb-NaCl-NaSulf, 236 g Oral Recon Soln Take 4,000 mL by mouth once for 1 dose. May substitute prescription with Golytely/Nulytely/Gavilyte and or generic equivalent, if needed. Take bowel preparation as provided by gastroenterology office, or visit our website at  [email or web address] 4000 mL 0     Omega-3 Fatty Acids (FISH OIL OR) Take by mouth. (Patient not taking: Reported on 4/10/2025)   Not Taking   [4]   No current Epic-ordered facility-administered medications on file.     No current [facility]-ordered outpatient medications on file.   [5] No Known Allergies  [6]   Family History  Problem Relation Age of Onset    Cancer Father         Colon    Skin cancer Father     Cancer Mother         breast    Cancer Daughter     Cancer Maternal Grandmother     Cancer Other    [7]   Social History  Socioeconomic History    Marital status: Single    Number of children: 0   Occupational History    Occupation: outside cable tech     Comment: retired   Tobacco Use    Smoking status: Never    Smokeless tobacco: Never   Vaping Use    Vaping status: Never Used   Substance and Sexual Activity    Alcohol use: Not Currently     Comment: 4 beers per day    Drug use: No   Other Topics Concern    Pt has a pacemaker No    Pt has a defibrillator No    Reaction to local anesthetic No

## 2025-04-18 NOTE — H&P
History & Physical Examination    Patient Name: Harry Parra  MRN: [id number]  CSN: [id number]  YOB: 1950    Diagnosis: colorectal cancer screening, history of adenomatous colon polyps    Last colonoscopy March 2022 with 4 sub-centimeter adenomatous colon polyps removed    Prescriptions Prior to Admission[1]  Current Hospital Medications[2]    Allergies: Allergies[3]    Past Medical History[4]  Past Surgical History[5]  Family History[6]  Social History     Tobacco Use    Smoking status: Never    Smokeless tobacco: Never   Substance Use Topics    Alcohol use: Not Currently     Comment: 4 beers per day       SYSTEM Check if Physical Exam is Normal If not normal, please explain:   HEENT [ X]    NECK  [ X]    HEART [ X]    LUNGS [ X]    ABDOMEN [ X]    EXTREMITIES [ X]      General:awake, cooperative, no acute distress  HEENT: EOMI, no scleral icterus, MMM; oral pharnyx is without exudates or lesions  Neck: no lymphadenopathy; thyroid is not enlarged and without nodules  CV: RRR  Resp: non-labored breathing  Abd: soft, non-tender, non-distended  Ext: no lower extremity swelling  Neuro: Alert, Oriented X 3  Skin: no rashes, bruises  Psych: normal affect  I have discussed the risks and benefits and alternatives of the procedure with the patient/family.  They understand and agreed to proceed with plan of care.   Isiah Houston MD  [facility] - Gastroenterology  4/18/2025  11:15 AM                   [1]   Medications Prior to Admission   Medication Sig Dispense Refill Last Dose/Taking    Clopidogrel Bisulfate (PLAVIX OR) Take by mouth.   4/12/2025    aspirin 81 MG Oral Tab EC Take 1 tablet (81 mg total) by mouth daily. 30 tablet 11 4/17/2025    metoprolol succinate ER 50 MG Oral Tablet 24 Hr Take 1 tablet (50 mg total) by mouth daily. 30 tablet 5 4/17/2025    atorvastatin 40 MG Oral Tab Take 1 tablet (40 mg total) by mouth nightly. 30 tablet 11 4/17/2025    Ascorbic Acid (VITAMIN C OR) Take by mouth.    2025    Ergocalciferol (VITAMIN D OR) Take by mouth.   2025    ZINC OR Use as directed in the mouth or throat.   2025    [] polyethylene glycol, PEG 3350-KCl-NaBcb-NaCl-NaSulf, 236 g Oral Recon Soln Take 4,000 mL by mouth once for 1 dose. May substitute prescription with Golytely/Nulytely/Gavilyte and or generic equivalent, if needed. Take bowel preparation as provided by gastroenterology office, or visit our website at [email or web address] 4000 mL 0     Omega-3 Fatty Acids (FISH OIL OR) Take by mouth. (Patient not taking: Reported on 4/10/2025)   Not Taking   [2]   Current Facility-Administered Medications   Medication Dose Route Frequency    lactated ringers infusion   Intravenous Continuous   [3] No Known Allergies  [4]   Past Medical History:   Basal cell carcinoma    skin of right nasal sidewall    Colon adenomas    x4    Heart attack (HCC)    High cholesterol    Retinal hemorrhage    left eye    Visual impairment    glasses   [5]   Past Surgical History:  Procedure Laterality Date    Cataract Left     Cath bare metal stent (bms)      x 1    Cath drug eluting stent      x 2    Colonoscopy N/A 2018    Procedure: COLONOSCOPY;  Surgeon: Gwendolyn Rodriguez MD;  Location: [facility] ENDOSCOPY    Colonoscopy N/A 2022    Procedure: COLONOSCOPY;  Surgeon: Isiah Houston MD;  Location: [facility] ENDOSCOPY    Exc skin malig 2.1-3cm face,facial Right     w/ skin graft    Inguinal hernia repair Right     Laser surgery of eye Right     Repr cmpl wnd lid,nos,ear 2.5-7.5      Retinal laser procedure Left 06/10/2021    repair retinal detachment   [6]   Family History  Problem Relation Age of Onset    Cancer Father         Colon    Skin cancer Father     Cancer Mother         breast    Cancer Daughter     Cancer Maternal Grandmother     Cancer Other

## 2025-04-21 ENCOUNTER — TELEPHONE (OUTPATIENT)
Facility: CLINIC | Age: 75
End: 2025-04-21

## 2025-04-21 NOTE — TELEPHONE ENCOUNTER
----- Message from Isiah Houston sent at 4/20/2025 10:11 AM CDT -----  GI staff:    Please call the patient and let him know the polyps removed were adenomas. These are the types of polyps he has had before. Recommend surveillance colonoscopy in 3 years. Please place recall.    Thanks    Isiah Houston MD  [facility] - Gastroenterology  4/20/2025  10:10 AM    ----- Message -----  From: Lab, Background User  Sent: 4/18/2025   4:42 PM CDT  To: Isiah Houston MD

## 2025-04-21 NOTE — TELEPHONE ENCOUNTER
3  year colonoscopy recall entered. Health maintenance updated.    Colonoscopy done on 4/18/25 and next due on 4/18/328.

## 2025-04-23 ENCOUNTER — TELEPHONE (OUTPATIENT)
Dept: FAMILY MEDICINE CLINIC | Facility: CLINIC | Age: 75
End: 2025-04-23

## 2025-04-29 ENCOUNTER — MED REC SCAN ONLY (OUTPATIENT)
Dept: FAMILY MEDICINE CLINIC | Facility: CLINIC | Age: 75
End: 2025-04-29

## 2025-04-30 ENCOUNTER — MED REC SCAN ONLY (OUTPATIENT)
Dept: FAMILY MEDICINE CLINIC | Facility: CLINIC | Age: 75
End: 2025-04-30

## 2025-06-04 ENCOUNTER — MED REC SCAN ONLY (OUTPATIENT)
Dept: FAMILY MEDICINE CLINIC | Facility: CLINIC | Age: 75
End: 2025-06-04

## 2025-06-24 ENCOUNTER — TELEPHONE (OUTPATIENT)
Dept: FAMILY MEDICINE CLINIC | Facility: CLINIC | Age: 75
End: 2025-06-24

## 2025-06-26 ENCOUNTER — HOSPITAL ENCOUNTER (OUTPATIENT)
Age: 75
Discharge: HOME | End: 2025-06-26
Payer: MEDICARE

## 2025-06-26 ENCOUNTER — NURSE TRIAGE (OUTPATIENT)
Dept: FAMILY MEDICINE CLINIC | Facility: CLINIC | Age: 75
End: 2025-06-26

## 2025-06-26 VITALS
DIASTOLIC BLOOD PRESSURE: 76 MMHG | SYSTOLIC BLOOD PRESSURE: 133 MMHG | HEART RATE: 74 BPM | OXYGEN SATURATION: 98 % | RESPIRATION RATE: 18 BRPM | TEMPERATURE: 98 F

## 2025-06-26 DIAGNOSIS — B02.8 HERPES ZOSTER WITH COMPLICATION: Primary | ICD-10-CM

## 2025-06-26 PROCEDURE — 99213 OFFICE O/P EST LOW 20 MIN: CPT

## 2025-06-26 RX ORDER — VALACYCLOVIR HYDROCHLORIDE 1 G/1
1000 TABLET, FILM COATED ORAL 3 TIMES DAILY
Qty: 21 TABLET | Refills: 0 | Status: SHIPPED | OUTPATIENT
Start: 2025-06-26 | End: 2025-07-03

## 2025-06-26 NOTE — DISCHARGE INSTRUCTIONS
Valacyclovir 1 tablet 3 times a day for 7 days  Wash your hands frequently  Wear shirt at all times  You are considered contagious until lesions crusted over  Follow-up with primary care provider in 1 week if no improvement

## 2025-06-26 NOTE — TELEPHONE ENCOUNTER
Action Requested: Summary for Provider     []  Critical Lab, Recommendations Needed  [] Need Additional Advice  []   FYI    []   Need Orders  [] Need Medications Sent to Pharmacy  []  Other     SUMMARY: Instructed to go to a walk-in clinic or urgent care, there are no available appointments today.     RN provided the walk-in clinic address in [facility] and the opening hours as requested. Informed that it is contagious .    Reason for call: Rash  Onset: last night       SYMPTOMS:Blistery and red  bumps, on his chest,axilla  and back, nipples are sore.    MEDICATIONS TRIED:none     HISTORY: [facility] queried done . Patient did not receive any Shingles vaccine according to the immunization record.      Reason for Disposition   Shingles rash (matches SYMPTOMS) and weak immune system (e.g., HIV positive, cancer chemotherapy, chronic steroid treatment, splenectomy) and NOT taking antiviral medication    Protocols used: Shingles-A-OH

## 2025-06-26 NOTE — ED PROVIDER NOTES
Chief Complaint   Patient presents with    Shingles       HPI:     Harry Parra is a 75 year old male who presents today with a chief complaint of painful rash to right chest, that radiates into the right back.  Rash is not itchy.  No fever.  No new soaps, lotions, detergents, or medications.    PFSH      PFSH asessment screens reviewed and agree.  Nurses notes reviewed I agree with documentation.    Family History[1]  Family history reviewed with patient/caregiver and is not pertinent to presenting problem.  Social History     Socioeconomic History    Marital status: Single     Spouse name: Not on file    Number of children: 0    Years of education: Not on file    Highest education level: Not on file   Occupational History    Occupation: outside cable tech     Comment: retired   Tobacco Use    Smoking status: Never    Smokeless tobacco: Never   Vaping Use    Vaping status: Never Used   Substance and Sexual Activity    Alcohol use: Not Currently     Comment: 4 beers per day    Drug use: No    Sexual activity: Not on file   Other Topics Concern    Grew up on a farm Not Asked    History of tanning Not Asked    Outdoor occupation Not Asked    Pt has a pacemaker No    Pt has a defibrillator No    Reaction to local anesthetic No   Social History Narrative    Not on file     Social Drivers of Health     Food Insecurity: No Food Insecurity (5/4/2024)    Food Insecurity     Food Insecurity: Never true   Transportation Needs: No Transportation Needs (5/4/2024)    Transportation Needs     Lack of Transportation: No   Housing Stability: Low Risk  (5/4/2024)    Housing Stability     Housing Instability: No     Housing Instability Emergency: Not on file     Crib or Bassinette: Not on file         ROS:   Positive for stated complaint: rash  All other systems reviewed and negative except as noted above.  Constitutional and Vital Signs Reviewed.      Physical Exam:     Rash:    Vesicles on erythematous base noted to right chest  underneath the nipple line, and right upper back.    Physical Exam:  /76   Pulse 74   Temp 97.8 °F (36.6 °C) (Oral)   Resp 18   SpO2 98%   GENERAL: well developed, well nourished, well hydrated, no distress  SKIN: good skin turgor, see above description for rash  HEENT: atraumatic, normocephalic, ears, nose and throat are clear  EYES: sclera non icteric bilateral  NECK: supple, no adenopathy  LUNGS: clear to auscultation, no RRW  CARDIO: RRR without murmur  EXTREMITIES: no cyanosis or edema. KAPADIA without difficulty.    MDM/Assessment/Plan:   Orders for this encounter:    Orders Placed This Encounter    valACYclovir 1 G Oral Tab     Sig: Take 1 tablet (1,000 mg total) by mouth 3 (three) times daily for 7 days.     Dispense:  21 tablet     Refill:  0       Labs performed this visit:  No results found for this or any previous visit (from the past 10 hours).    MDM:  Medical Decision Making  Differentials considered: Herpes zoster versus contact dermatitis versus allergic dermatitis versus other    HPI and exam consistent with herpes zoster.  Patient is healthy appearing, normal vitals.  Will start valacyclovir.  Advised on trajectory of this illness.  Contagion precautions discussed.  Advised follow-up with primary care provider in 1 week if no improvement.  Patient verbalized understanding and agreeable to plan of care.         Diagnosis:    ICD-10-CM    1. Herpes zoster with complication  B02.8           All results reviewed and discussed with patient.  See AVS for detailed discharge instructions for your condition today.    Follow Up with:  No follow-up provider specified.          [1]   Family History  Problem Relation Age of Onset    Cancer Father         Colon    Skin cancer Father     Cancer Mother         breast    Cancer Daughter     Cancer Maternal Grandmother     Cancer Other

## 2025-06-26 NOTE — ED INITIAL ASSESSMENT (HPI)
Onset of rash last night. Cluster, blistering rash to right side of chest around to back. Describes burning pain.

## 2025-07-16 ENCOUNTER — MED REC SCAN ONLY (OUTPATIENT)
Dept: FAMILY MEDICINE CLINIC | Facility: CLINIC | Age: 75
End: 2025-07-16

## 2025-07-22 ENCOUNTER — TELEPHONE (OUTPATIENT)
Dept: FAMILY MEDICINE CLINIC | Facility: CLINIC | Age: 75
End: 2025-07-22

## 2025-07-22 NOTE — TELEPHONE ENCOUNTER
Patient contacts clinic reporting post herpetic pain from recent shingles infection.  Rash has resolved but pain continues.  Inquires on medication. Acute follow up scheduled 07/23.    Future Appointments   Date Time Provider Department Center   7/23/2025  2:50 PM Vargas Tony MD [facility]

## 2025-07-23 ENCOUNTER — OFFICE VISIT (OUTPATIENT)
Dept: FAMILY MEDICINE CLINIC | Facility: CLINIC | Age: 75
End: 2025-07-23

## 2025-07-23 VITALS
DIASTOLIC BLOOD PRESSURE: 64 MMHG | RESPIRATION RATE: 16 BRPM | HEART RATE: 80 BPM | TEMPERATURE: 98 F | SYSTOLIC BLOOD PRESSURE: 102 MMHG | WEIGHT: 187 LBS | HEIGHT: 72 IN | BODY MASS INDEX: 25.33 KG/M2

## 2025-07-23 DIAGNOSIS — B02.29 POSTHERPETIC NEURALGIA: Primary | ICD-10-CM

## 2025-07-23 PROCEDURE — 1159F MED LIST DOCD IN RCRD: CPT | Performed by: FAMILY MEDICINE

## 2025-07-23 PROCEDURE — 1125F AMNT PAIN NOTED PAIN PRSNT: CPT | Performed by: FAMILY MEDICINE

## 2025-07-23 PROCEDURE — 3078F DIAST BP <80 MM HG: CPT | Performed by: FAMILY MEDICINE

## 2025-07-23 PROCEDURE — 1160F RVW MEDS BY RX/DR IN RCRD: CPT | Performed by: FAMILY MEDICINE

## 2025-07-23 PROCEDURE — 3074F SYST BP LT 130 MM HG: CPT | Performed by: FAMILY MEDICINE

## 2025-07-23 PROCEDURE — 3008F BODY MASS INDEX DOCD: CPT | Performed by: FAMILY MEDICINE

## 2025-07-23 PROCEDURE — 99213 OFFICE O/P EST LOW 20 MIN: CPT | Performed by: FAMILY MEDICINE

## 2025-07-23 RX ORDER — GABAPENTIN 100 MG/1
100 CAPSULE ORAL 3 TIMES DAILY
Qty: 30 CAPSULE | Refills: 0 | Status: SHIPPED | OUTPATIENT
Start: 2025-07-23

## 2025-07-23 NOTE — PROGRESS NOTES
Subjective:   Patient ID: Harry Parra is a 75 year old male.    Pt presents for follow up from the urgent care for rash on right side of chest. Was diagnosed with shingles. Patient was treated with valtrex. Patient states rash is better but still has burning pain. Has tried some otc tylenol for this. No fevesr.          History/Other:   Review of Systems   Constitutional:  Negative for fever.   Skin:  Rash: better.     Current Medications[1]  Allergies:Allergies[2]    Objective:   Physical Exam  Constitutional:       Appearance: Normal appearance.   Skin:     Findings: Rash: resolved with some residual area of right chest area.   Neurological:      Mental Status: He is alert.         Assessment & Plan:   1. Postherpetic neuralgia: rash much better:  - After discussion with patient, gabapentin as directed; Over the counter remedies discussed; To call if worse or not better; Follow up in one week if not resolved or as needed if worse. Discussed shingles vaccine.          No orders of the defined types were placed in this encounter.      Meds This Visit:  Requested Prescriptions     Signed Prescriptions Disp Refills    gabapentin 100 MG Oral Cap 30 capsule 0     Sig: Take 1 capsule (100 mg total) by mouth 3 (three) times daily. As needed.       Imaging & Referrals:  None         [1]   Current Outpatient Medications   Medication Sig Dispense Refill    gabapentin 100 MG Oral Cap Take 1 capsule (100 mg total) by mouth 3 (three) times daily. As needed. 30 capsule 0    Clopidogrel Bisulfate (PLAVIX OR) Take by mouth.      aspirin 81 MG Oral Tab EC Take 1 tablet (81 mg total) by mouth daily. 30 tablet 11    metoprolol succinate ER 50 MG Oral Tablet 24 Hr Take 1 tablet (50 mg total) by mouth daily. 30 tablet 5    atorvastatin 40 MG Oral Tab Take 1 tablet (40 mg total) by mouth nightly. 30 tablet 11    Ascorbic Acid (VITAMIN C OR) Take by mouth.      Ergocalciferol (VITAMIN D OR) Take by mouth.      ZINC OR Use as directed in  the mouth or throat.      Omega-3 Fatty Acids (FISH OIL OR) Take by mouth. (Patient not taking: Reported on 7/23/2025)     [2] No Known Allergies

## 2025-07-30 ENCOUNTER — TELEPHONE (OUTPATIENT)
Dept: FAMILY MEDICINE CLINIC | Facility: CLINIC | Age: 75
End: 2025-07-30

## 2025-07-30 RX ORDER — GABAPENTIN 300 MG/1
300 CAPSULE ORAL 3 TIMES DAILY
Qty: 30 CAPSULE | Refills: 0 | Status: SHIPPED | OUTPATIENT
Start: 2025-07-30

## 2025-08-08 ENCOUNTER — TELEPHONE (OUTPATIENT)
Dept: FAMILY MEDICINE CLINIC | Facility: CLINIC | Age: 75
End: 2025-08-08

## 2025-08-08 ENCOUNTER — NURSE TRIAGE (OUTPATIENT)
Dept: FAMILY MEDICINE CLINIC | Facility: CLINIC | Age: 75
End: 2025-08-08

## 2025-08-08 DIAGNOSIS — B02.29 POSTHERPETIC NEURALGIA: Primary | ICD-10-CM

## 2025-08-09 RX ORDER — ACETAMINOPHEN AND CODEINE PHOSPHATE 300; 30 MG/1; MG/1
1 TABLET ORAL EVERY 6 HOURS PRN
Qty: 30 TABLET | Refills: 0 | Status: SHIPPED | OUTPATIENT
Start: 2025-08-09

## (undated) DEVICE — PROBE LASER ENDO DISP 25 GA

## (undated) DEVICE — SUTURE PLAIN GUT 6-0 TG140-8

## (undated) DEVICE — FORCEP RADIAL JAW 4

## (undated) DEVICE — DISP ATKINSON RETROBULBAR NDL 25G 10/BOX: Brand: DISP ATKINSON RETROBULBAR NDL 25G 10/BOX

## (undated) DEVICE — RETINAL: Brand: MEDLINE INDUSTRIES, INC.

## (undated) DEVICE — KIT CLEAN ENDOKIT 1.1OZ GOWNX2

## (undated) DEVICE — SUTURE SILK 0

## (undated) DEVICE — ENDOSCOPY PACK - LOWER: Brand: MEDLINE INDUSTRIES, INC.

## (undated) DEVICE — FILTER FLUID EYE E4429-22

## (undated) DEVICE — KIT ENDO ORCAPOD 160/180/190

## (undated) DEVICE — Device

## (undated) DEVICE — PACK SRG BIOM FULL DRP STRL

## (undated) DEVICE — Device: Brand: DEFENDO AIR/WATER/SUCTION AND BIOPSY VALVE

## (undated) DEVICE — FORCEPS BX LG 2.4MM X 240CM NDL RAD JAW 4

## (undated) DEVICE — LINE MNTR ADLT SET O2 INTMD

## (undated) DEVICE — CATH RED RUBBER 14FR

## (undated) DEVICE — 25+ TOTAL PLUS VITRECTORY PAK, BEVELED 10,000 CPM ULTRAVIT PROBE STRAIGHT ENDOILLUMINATOR: Brand: CONSTELLATION, EDGE PLUS, TOTAL PLUS, ULTRAVIT

## (undated) DEVICE — PILLOW FACE DOWN W/CASE SM

## (undated) DEVICE — MEDI-VAC NON-CONDUCTIVE SUCTION TUBING 6MM X 1.8M (6FT.) L: Brand: CARDINAL HEALTH

## (undated) DEVICE — SOL  .9 1000ML BTL

## (undated) DEVICE — SUTURE SILK 4-0 S2782K

## (undated) DEVICE — SOLUTION IRR BSS+

## (undated) DEVICE — SINGLE-USE SNARE: Brand: CAPTIVATOR™ COLD

## (undated) DEVICE — SOLUTION IRR BTL 250CC NACL

## (undated) DEVICE — ISPAN C3F8 PERFLUOROPROPANEISPAN* C3F8 IS A FLUORINATED GREENHOUSE GAS COVERED BY THE KYOTO PROTOCOL AND HAS A GLOBAL WARMING POTENTIAL (GWP) OF 8,600. RESIDUAL ISPAN* C3F8 GAS SHOULD BE RECOVERED BY APPROPRIATELY QUALIFIED PERSONNEL AND RECYCLED, RECLAIMED OR DESTROYED IN ACCORDANCE WITH LOCAL ORDINANCES.: Brand: ISPAN

## (undated) DEVICE — 60 ML SYRINGE CATHETER TIP: Brand: MONOJECT

## (undated) DEVICE — 35 ML SYRINGE REGULAR TIP: Brand: MONOJECT

## (undated) DEVICE — 60 ML SYRINGE REGULAR TIP: Brand: MONOJECT

## (undated) DEVICE — 6 ML SYRINGE LUER-LOCK TIP: Brand: MONOJECT

## (undated) DEVICE — 3M™ MICROFOAM™ SURGICAL TAPE, 2 INCHES X 5 YARDS, 6 ROLLS/CARTON, 6 CARTONS/CASE, 1528-2: Brand: 3M™ MICROFOAM™

## (undated) DEVICE — SOL H2O 1000ML BTL

## (undated) DEVICE — ENCORE® LATEX MICRO SIZE 6.5, STERILE LATEX POWDER-FREE SURGICAL GLOVE: Brand: ENCORE

## (undated) DEVICE — Device: Brand: JELCO

## (undated) DEVICE — V2 SPECIMEN COLLECTION MANIFOLD KIT: Brand: NEPTUNE

## (undated) DEVICE — ENCORE® LATEX MICRO SIZE 8, STERILE LATEX POWDER-FREE SURGICAL GLOVE: Brand: ENCORE

## (undated) DEVICE — 25GA BIPOLAR CAUTERY: Brand: SYNERGETICS

## (undated) DEVICE — APPLICATOR COTTON TIP 3 10/PK

## (undated) NOTE — Clinical Note
Will need referral to ENT to Dr. Marcus Stack for consult, excision of BCC right nasal sidewall.  bx done today F/u in derm in 4-6 mos Luz Leon

## (undated) NOTE — LETTER
[facility]  Administration of Anesthesia  IHarry agree to be cared for by a physician anesthesiologist alone and/or with a nurse anesthetist, who is specially trained to monitor me and give me medicine to put me to sleep or keep me comfortable during my procedure    I understand that my anesthesiologist and/or anesthetist is not an employee or agent of [facility] or [employer]. He or she works for [facility]    As the patient asking for anesthesia services, I agree to:  Allow the anesthesiologist (anesthesia doctor) to give me medicine and do additional procedures as necessary. Some examples are: Starting or using an “IV” to give me medicine, fluids or blood during my procedure, and having a breathing tube placed to help me breathe when I’m asleep (intubation). In the event that my heart stops working properly, I understand that my anesthesiologist will make every effort to sustain my life, unless otherwise directed by [facility] Do Not Resuscitate documents.  Tell my anesthesia doctor before my procedure:  If I am pregnant.  The last time that I ate or drank.  iii. All of the medicines I take (including prescriptions, herbal supplements, and pills I can buy without a prescription (including street drugs/illegal medications). Failure to inform my anesthesiologist about these medicines may increase my risk of anesthetic complications.  iv.If I am allergic to anything or have had a reaction to anesthesia before.  I understand how the anesthesia medicine will help me (benefits).  I understand that with any type of anesthesia medicine there are risks:  The most common risks are: nausea, vomiting, sore throat, muscle soreness, damage to my eyes, mouth, or teeth (from breathing tube placement).  Rare risks include: remembering what happened during my procedure, allergic reactions to medications, injury to my airway, heart, lungs, vision, nerves, or muscles  and in extremely rare instances death.  My doctor has explained to me other choices available to me for my care (alternatives).  Pregnant Patients (“epidural”):  I understand that the risks of having an epidural (medicine given into my back to help control pain during labor), include itching, low blood pressure, difficulty urinating, headache or slowing of the baby’s heart. Very rare risks include infection, bleeding, seizure, irregular heart rhythms and nerve injury.  Regional Anesthesia (“spinal”, “epidural”, & “nerve blocks”):  I understand that rare but potential complications include headache, bleeding, infection, seizure, irregular heart rhythms, and nerve injury.    _____________________________________________________________________________  Patient (or Representative) Signature/Relationship to Patient  Date   Time    _____________________________________________________________________________   Name (if used)    Language/Organization   Time    _____________________________________________________________________________  Nurse Anesthetist Signature     Date   Time  _____________________________________________________________________________  Anesthesiologist Signature     Date   Time  I have discussed the procedure and information above with the patient (or patient’s representative) and answered their questions. The patient or their representative has agreed to have anesthesia services.    _____________________________________________________________________________  Witness        Date   Time  I have verified that the signature is that of the patient or patient’s representative, and that it was signed before the procedure  Patient Name: Harry Parra     : 1950                 Printed: 2025 at 4:54 PM    Medical Record #: [id number]                                            Page 1 of 1  ----------ANESTHESIA CONSENT----------

## (undated) NOTE — LETTER
Perlita Hawkins        Dear Cyndee Limon: On behalf of your primary doctor Nestor Goodpasture MD, we have attempted to reach you by phone.       To provide you with the best possible care, please give us a call at yo

## (undated) NOTE — LETTER
[address] [place]  [address]  Phone: [phone]  Fax: [phone]       Hello,     This is the [facility], office of Dr. Vargas Tony.    Thank you for putting your trust in [facility].  Our goal is to deliver the highest quality healthcare and an exceptional patient experience. Review of your medical record shows you are due for the following:         Annual Medicare Physical           Please call [phone] to schedule your appointment or schedule online via Webbynode.     If you changed to a new provider at another facility, please notify the clinic to update your records.    If you had any recent testing at another facility, please have your results faxed to our office at [phone].     Thank you and have a great day!

## (undated) NOTE — Clinical Note
Patient Name: Julius Richards  : 1950  MRN: [id number]  Patient Address: [address] [place] [address] is committed to the safety and well-being of our patients, members, employees, an treatments, when available and appropriate, should be given as soon as possible after diagnosis.       Seek Further Care      If you are awaiting test results or are confirmed positive for COVID-19, and your symptoms worsen at home with symptoms such as: ex household cleaning sprays or wipes according to the label instructions. Post-Discharge Follow-up  Please call your primary care provider within two days of your discharge to arrange for a telehealth follow-up.  CDC does not recommend repeat testing Prevention (CDC)  10 things you can do to manage your health at home, Americachange.nl. pdf  [employer]  ht

## (undated) NOTE — LETTER
[address] [place]  [address]  Phone: [phone]  Fax: [phone]       Hello,     This is the [facility], office of Dr. Vargas Tony.    Thank you for putting your trust in [facility].  Our goal is to deliver the highest quality healthcare and an exceptional patient experience. Review of your medical record shows you are due for the following:         Annual Medicare Physical         Please call [phone] to schedule your appointment or schedule online via [employer].     If you changed to a new provider at another facility, please notify the clinic to update your records.    If you had any recent testing at another facility, please have your results faxed to our office at [phone].     Thank you and have a great day!

## (undated) NOTE — LETTER
[address] [place] [address]      Authorization for Surgical Operation and Procedure     Date:___________                                                                                                         Time:__________  1. I hereby Pop Lyle MD, my physician and his/her assistants (if applicable), which may include medical students, residents, and/or fellows, to perform the following surgical operation/ procedure and administer such anesthesia as may be determined necessary by my physician:  Operation/Procedure name (s) COLONOSCOPY  on Roena Ket   2.   I recognize that during the surgical operation/procedure, unforeseen conditions may necessitate additional or different procedures than those listed above. I, therefore, further authorize and request that the above-named surgeon, assistants, or designees perform such procedures as are, in their judgment, necessary and desirable. 3.   My surgeon/physician has discussed prior to my surgery the potential benefits, risks and side effects of this procedure; the likelihood of achieving goals; and potential problems that might occur during recuperation. They also discussed reasonable alternatives to the procedure, including risks, benefits, and side effects related to the alternatives and risks related to not receiving this procedure. I have had all my questions answered and I acknowledge that no guarantee has been made as to the result that may be obtained. 4.   Should the need arise during my operation or immediate post-operative period, I also consent to the administration of blood and/or blood products. Further, I understand that despite careful testing and screening of blood or blood products by collecting agencies, I may still be subject to ill effects as a result of receiving a blood transfusion and/or blood products.   The following are some, but not all, of the potential risks that can occur: fever and allergic reactions, hemolytic reactions, transmission of diseases such as Hepatitis, AIDS and Cytomegalovirus (CMV) and fluid overload. In the event that I wish to have an autologous transfusion of my own blood, or a directed donor transfusion. I will discuss this with my physician. 5.   I authorize the use of any specimen, organs, tissues, body parts or foreign objects that may be removed from my body during the operation/procedure for diagnosis, research or teaching purposes and their subsequent disposal by hospital authorities. I also authorize the release of specimen test results and/or written reports to my treating physician on the hospital medical staff or other referring or consulting physicians involved in my care, at the discretion of the Pathologist or my treating physician. 6.   I consent to the photographing or videotaping of the operations or procedures to be performed, including appropriate portions of my body for medical, scientific, or educational purposes, provided my identity is not revealed by the pictures or by descriptive texts accompanying them. If the procedure has been photographed/videotaped, the surgeon will obtain the original picture, image, videotape or CD. The hospital will not be responsible for storage, release or maintenance of the picture, image, tape or CD.    7.   I consent to the presence of a  or observers in the operating room as deemed necessary by my physician or their designees. 8.   I recognize that in the event my procedure results in extended X-Ray/fluoroscopy time, I may develop a skin reaction. 9. If I have a Do Not Attempt Resuscitation (DNAR) order in place, that status will be suspended while in the operating room, procedural suite, and during the recovery period unless otherwise explicitly stated by me (or a person authorized to consent on my behalf).  The surgeon or my attending physician will determine when the applicable recovery period ends for purposes of reinstating the DNAR order. 10. Patients having a sterilization procedure: I understand that if the procedure is successful the results will be permanent and it will therefore be impossible for me to inseminate, conceive, or bear children. I also understand that the procedure is intended to result in sterility, although the result has not been guaranteed. 11. I acknowledge that my physician has explained sedation/analgesia administration to me including the risk and benefits I consent to the administration of sedation/analgesia as may be necessary or desirable in the judgment of my physician. I CERTIFY THAT I HAVE READ AND FULLY UNDERSTAND THE ABOVE CONSENT TO OPERATION and/or OTHER PROCEDURE.    _________________________________________  __________________________________  Signature of Patient     Signature of Responsible Person         ___________________________________         Printed Name of Responsible Person           _________________________________                  Relationship to Patient  _________________________________________  ______________________________  Signature of Witness          Date  Time    STATEMENT OF PHYSICIAN My signature below affirms that prior to the time of the procedure; I have explained to the patient and/or his/her legal representative, the risks and benefits involved in the proposed treatment and any reasonable alternative to the proposed treatment. I have also explained the risks and benefits involved in refusal of the proposed treatment and alternatives to the proposed treatment and have answered the patient's questions. If I have a significant financial interest in a co-management agreement or a significant financial interest in any product or implant, or other significant relationship used in this procedure/surgery, I have disclosed this and had a discussion with my patient. _______________________________________________________________ _____________________________  Juani Grief of Physician)                                                                                         (Date)                                   (Time)        Patient Name: Flavia Harry    : 1950   Printed: 3/11/2022      Medical Record #: [id number]                                              Page 1 of 1

## (undated) NOTE — LETTER
[facility] [place], [address] [place] [address] Dr Facundo Sepulveda,  [address]       12/12/22        Patient: Ramona Jeter   YOB: 1950   Date of Visit: 12/12/2022       Dear  Dr. Soy Cowan MD,      Thank you for referring Ramona Jeter to my practice. Please find my assessment and plan below. Left groin pain  (primary encounter diagnosis)    81year old male with resolving R groin pain, probable muscle strain or tearing of scar tissue from over-exertion. No evidence for recurrent hernia, possible djd/arthritis contributing. Discussed in detail with patient and options reviewed. Rest and observation, expect gradual improvement, f/u in 2-3 months if lingering concerns, regular medical f/u.                 Sincerely,   MD [facility]  [address] [place] [phone]    Document electronically generated by:  Franca Anand MD

## (undated) NOTE — LETTER
Hello,     This is the [facility], office of Dr. June Recinos. Thank you for putting your trust in [facility]. Our goal is to deliver the highest quality healthcare and an exceptional patient experience. Review of your medical record shows you are due for the following:       Annual Medicare Physical     Please call [phone] to schedule your appointment or schedule online via [employer]o. If you changed to a new provider at another facility, please notify the clinic to update your records. If you had any recent testing at another facility, please have your results faxed to our office at [phone]. Thank you and have a great day!

## (undated) NOTE — ED AVS SNAPSHOT
[facility] Emergency Department    [address]     [place] [address]    Phone:  [phone]    Fax:  [phone]           Jorge Sae   MRN: [id number]    Department:  [facility] Emergency Department   Date of Visit:  4/15/2017 and Class Registration line at [phone] or find a doctor online by visiting [email or web address].    IF THERE IS ANY CHANGE OR WORSENING OF YOUR CONDITION, CALL YOUR PRIMARY CARE PHYSICIAN AT ONCE OR RETURN IMMEDIATELY TO [address] [place] [address].     If

## (undated) NOTE — MR AVS SNAPSHOT
[facility] [place] [address]  [id number] [phone]               Thank you for choosing us for your health care visit with Rosemary Arora MD.  We are glad to serve you and happy to provide you with this summary of y numbers can create reimbursement difficulties for you. [facility] facility for follow up rabies vaccinations.     Assoc Dx:  Nikhil Perez bite, subsequent encounter [W55.51XD], Need for immunization against rabies [Z23]          Reason for Today's Visit Weight Reduction Maintain normal body weight (body mass index 18.5-24.9 kg/m2)   DASH eating plan Adopt a diet rich in fruits, vegetables, and low fat dairy products with reduced content of saturated and total fat.    Dietary sodium reduction Reduce dietary Make half your plate fruits and vegetables Highly refined, white starches including white bread, rice and pasta   Eat plenty of protein, keep the fat content low Sugars:  sodas and sports drinks, candies and desserts   Eat plenty of low-fat dairy products

## (undated) NOTE — LETTER
[facility]  Administration of Anesthesia  1. I, Quang Bueno, or _________________________________ acting on his behalf, (Patient) (Dependent/Representative) request to receive anesthesia for my pending procedure/operation/treatment. A physician (anesthesiologist) alone or an anesthesiologist working with a nurse anesthetist may administer my anesthesia. 2. I understand that my anesthesiologist is not an employee or agent of the hospital, but is an independent medical practitioner who has been permitted to use its facilities for the care and treatment of his/her patients. 3. I acknowledge that a physician from [facility] or their designate(s), recommended anesthesia for me using her/his medical judgment. The type(s) of anesthesia I may receive include:                a) General Anesthesia, b) Spinal/Epidural Anesthesia, c) Regional Anesthesia or d) Monitored Anesthesia Care. 4. If my spinal, regional or monitored anesthesia care (local) is not satisfactory for my comfort, or if my medical condition requires, I consent to the administration of general anesthesia. 5. I am aware that the practice of anesthesiology is not an exact science and that some foreseeable risks or consequences may occur. Some common risks/consequences include sore throat and hoarseness, nausea and vomiting, muscle soreness, backache, damage to the mouth/teeth/vocal cords and eye injury. I understand that more rare but serious potential risks of anesthesia include blood pressure changes, drug reactions, cardiac arrest, brain damage, paralysis or death. These risks apply to whether I have general, spinal/epidural, regional or monitored anesthesia care. 6. OBSTETRIC PATIENTS: Specific risks/consequences of spinal/epidural anesthesia may include itching, low blood pressure, difficulty urinating, slowing of the baby's heart rate and headache.  Rare risks include infections, high spinal block, spinal bleeding, seizure, cardiac arrest and death. 7. AWARENESS: I understand that it is possible (but unlikely) to have explicit memory of events from the operating room while under general anesthesia. 8. ELECTROCONVULSIVE THERAPY PATIENTS: This consent serves for all treatments in a single course of therapy. 9. I understand that I must inform my anesthesiologist when I last ate and/or drank to minimize the risk of anesthesia. 10. If I am pregnant, or may pregnant, I understand that elective surgery should be postponed until after the baby is born. Anesthetics cross the placenta and may temporarily anesthetize the baby. Although fetal complications of anesthesia during pregnancy are rare, they may include birth defects, premature labor, brain damage and death. 11. I certify that I informed the anesthesiologist, to the best of my ability, about medication I take including blood thinners, anticoagulants, herbal remedies, narcotics and recreational drugs (e.g. cocaine, marijuana, PCP). Failure to inform my anesthesiologist about these medicines may increase my risk of anesthetic complications. The nature and purpose of my anesthetic management was explained to me. I had the opportunity to ask questions and the answers and information provided meet my satisfaction.   I retain the right to withdraw this consent at any time prior to the administration of said anesthetic.    ___________________________________________________           _____________________________________________________  Patient Signature                                                                                      Witness Signature                ___________________________________________________           _____________________________________________________  Date/Time                                                                                               Responsible person in case of minor/ unconscious pt /Relationship    My signature below affirms that prior to the time of the procedure, I have explained to the patient and/or his/her guardian, the risks and benefits of undergoing anesthesia, as well as any reasonable alternatives.     ___________________________________________________            _____________________________________________________  Physician Signature                            Date/Time  Patient Name: Gisella Song     : 1950     Printed: 3/11/2022      Medical Record #: [id number]                              Page 1 of 1    ----------ANESTHESIA CONSENT----------

## (undated) NOTE — LETTER
Cesar Arthur, [address]       09/26/17        Patient: Jahaira Vasquez   YOB: 1950   Date of Visit: 9/26/2017       Dear  Dr. Haresh Lopez MD,      Thank you for referring Jahaira Vasquez to my practice.   Please find my as

## (undated) NOTE — ED AVS SNAPSHOT
[facility] Emergency Department    [place] [address]     [place] [address]    Phone:  [phone]    Fax:  [phone]           Vanessa Ledezma   MRN: [id number]    Department:  [facility] Emergency Department   Date of Visit:  4/15/2017 No appointment is needed    If you have any questions about the follow up care required, please contact the [facility] Emergency Room Charge Nurse at [phone].           Disclosure     Insurance plans vary and the physician(s) referred by the IF THERE IS ANY CHANGE OR WORSENING OF YOUR CONDITION, CALL YOUR PRIMARY CARE PHYSICIAN AT ONCE OR RETURN IMMEDIATELY TO THE EMERGENCY DEPARTMENT.     If you have been prescribed any medication(s), please fill your prescription right away and begin taking t - If you have concerns related to behavioral health issues or thoughts of harming yourself, contact [address] [place] [address] at [phone].     - If you don’t have insurance, [facility]athy [facility] has partnered with Patient Ezuza Guero

## (undated) NOTE — LETTER
[facility]  [address], [place], [place]    Authorization for Surgical Operation and Procedure                               I hereby authorize Isiah Houston MD, my physician and his/her assistants (if applicable), which may include medical students, residents, and/or fellows, to perform the following surgical operation/ procedure and administer such anesthesia as may be determined necessary by my physician: Operation/Procedure name (s) COLONOSCOPY on Harry Parra   2.   I recognize that during the surgical operation/procedure, unforeseen conditions may necessitate additional or different procedures than those listed above.  I, therefore, further authorize and request that the above-named surgeon, assistants, or designees perform such procedures as are, in their judgment, necessary and desirable.    3.   My surgeon/physician has discussed prior to my surgery the potential benefits, risks and side effects of this procedure; the likelihood of achieving goals; and potential problems that might occur during recuperation.  They also discussed reasonable alternatives to the procedure, including risks, benefits, and side effects related to the alternatives and risks related to not receiving this procedure.  I have had all my questions answered and I acknowledge that no guarantee has been made as to the result that may be obtained.    4.   Should the need arise during my operation/procedure, which includes change of level of care prior to discharge, I also consent to the administration of blood and/or blood products.  Further, I understand that despite careful testing and screening of blood or blood products by collecting agencies, I may still be subject to ill effects as a result of receiving a blood transfusion and/or blood products.  The following are some, but not all, of the potential risks that can occur: fever and allergic reactions, hemolytic reactions, transmission of diseases such as  Hepatitis, AIDS and Cytomegalovirus (CMV) and fluid overload.  In the event that I wish to have an autologous transfusion of my own blood, or a directed donor transfusion, I will discuss this with my physician.  Check only if Refusing Blood or Blood Products  I understand refusal of blood or blood products as deemed necessary by my physician may have serious consequences to my condition to include possible death. I hereby assume responsibility for my refusal and release the hospital, its personnel, and my physicians from any responsibility for the consequences of my refusal.    o  Refuse   5.   I authorize the use of any specimen, organs, tissues, body parts or foreign objects that may be removed from my body during the operation/procedure for diagnosis, research or teaching purposes and their subsequent disposal by hospital authorities.  I also authorize the release of specimen test results and/or written reports to my treating physician on the hospital medical staff or other referring or consulting physicians involved in my care, at the discretion of the Pathologist or my treating physician.    6.   I consent to the photographing or videotaping of the operations or procedures to be performed, including appropriate portions of my body for medical, scientific, or educational purposes, provided my identity is not revealed by the pictures or by descriptive texts accompanying them.  If the procedure has been photographed/videotaped, the surgeon will obtain the original picture, image, videotape or CD.  The hospital will not be responsible for storage, release or maintenance of the picture, image, tape or CD.    7.   I consent to the presence of a  or observers in the operating room as deemed necessary by my physician or their designees.    8.   I recognize that in the event my procedure results in extended X-Ray/fluoroscopy time, I may develop a skin reaction.    9. If I have a Do Not Attempt  Resuscitation (DNAR) order in place, that status will be suspended while in the operating room, procedural suite, and during the recovery period unless otherwise explicitly stated by me (or a person authorized to consent on my behalf). The surgeon or my attending physician will determine when the applicable recovery period ends for purposes of reinstating the DNAR order.  10. Patients having a sterilization procedure: I understand that if the procedure is successful the results will be permanent and it will therefore be impossible for me to inseminate, conceive, or bear children.  I also understand that the procedure is intended to result in sterility, although the result has not been guaranteed.   11. I acknowledge that my physician has explained sedation/analgesia administration to me including the risk and benefits I consent to the administration of sedation/analgesia as may be necessary or desirable in the judgment of my physician.    I CERTIFY THAT I HAVE READ AND FULLY UNDERSTAND THE ABOVE CONSENT TO OPERATION and/or OTHER PROCEDURE.     ____________________________________  _________________________________        ______________________________  Signature of Patient    Signature of Responsible Person                Printed Name of Responsible Person                                      ____________________________________  _____________________________                ________________________________  Signature of Witness        Date  Time         Relationship to Patient    STATEMENT OF PHYSICIAN My signature below affirms that prior to the time of the procedure; I have explained to the patient and/or his/her legal representative, the risks and benefits involved in the proposed treatment and any reasonable alternative to the proposed treatment. I have also explained the risks and benefits involved in refusal of the proposed treatment and alternatives to the proposed treatment and have answered the patient's  questions. If I have a significant financial interest in a co-management agreement or a significant financial interest in any product or implant, or other significant relationship used in this procedure/surgery, I have disclosed this and had a discussion with my patient.     _____________________________________________________              _____________________________  (Signature of Physician)                                                                                         (Date)                                   (Time)  Patient Name: Harry Parra      : 1950      Printed: 2025     Medical Record #: [id number]                                      Page 1 of 1